# Patient Record
Sex: MALE | Race: WHITE | NOT HISPANIC OR LATINO | Employment: OTHER | URBAN - METROPOLITAN AREA
[De-identification: names, ages, dates, MRNs, and addresses within clinical notes are randomized per-mention and may not be internally consistent; named-entity substitution may affect disease eponyms.]

---

## 2019-01-23 ENCOUNTER — HISTORICAL (OUTPATIENT)
Dept: ADMINISTRATIVE | Facility: HOSPITAL | Age: 69
End: 2019-01-23

## 2022-12-16 ENCOUNTER — OFFICE VISIT (OUTPATIENT)
Dept: URGENT CARE | Facility: CLINIC | Age: 72
End: 2022-12-16
Payer: MEDICARE

## 2022-12-16 VITALS
HEART RATE: 61 BPM | WEIGHT: 210 LBS | OXYGEN SATURATION: 95 % | DIASTOLIC BLOOD PRESSURE: 83 MMHG | TEMPERATURE: 99 F | BODY MASS INDEX: 31.83 KG/M2 | SYSTOLIC BLOOD PRESSURE: 123 MMHG | HEIGHT: 68 IN | RESPIRATION RATE: 20 BRPM

## 2022-12-16 DIAGNOSIS — J06.9 VIRAL URI WITH COUGH: Primary | ICD-10-CM

## 2022-12-16 LAB
CTP QC/QA: YES
MOLECULAR STREP A: NEGATIVE
POC MOLECULAR INFLUENZA A AGN: NEGATIVE
POC MOLECULAR INFLUENZA B AGN: NEGATIVE
SARS-COV-2 RDRP RESP QL NAA+PROBE: NEGATIVE

## 2022-12-16 PROCEDURE — 87502 POCT INFLUENZA A/B MOLECULAR: ICD-10-PCS | Mod: QW,,, | Performed by: FAMILY MEDICINE

## 2022-12-16 PROCEDURE — 99213 OFFICE O/P EST LOW 20 MIN: CPT | Mod: ,,, | Performed by: FAMILY MEDICINE

## 2022-12-16 PROCEDURE — 87651 POCT STREP A MOLECULAR: ICD-10-PCS | Mod: QW,,, | Performed by: FAMILY MEDICINE

## 2022-12-16 PROCEDURE — 99213 PR OFFICE/OUTPT VISIT, EST, LEVL III, 20-29 MIN: ICD-10-PCS | Mod: ,,, | Performed by: FAMILY MEDICINE

## 2022-12-16 PROCEDURE — 87635 SARS-COV-2 COVID-19 AMP PRB: CPT | Mod: QW,CR,, | Performed by: FAMILY MEDICINE

## 2022-12-16 PROCEDURE — 87651 STREP A DNA AMP PROBE: CPT | Mod: QW,,, | Performed by: FAMILY MEDICINE

## 2022-12-16 PROCEDURE — 87502 INFLUENZA DNA AMP PROBE: CPT | Mod: QW,,, | Performed by: FAMILY MEDICINE

## 2022-12-16 PROCEDURE — 87635: ICD-10-PCS | Mod: QW,CR,, | Performed by: FAMILY MEDICINE

## 2022-12-16 RX ORDER — TAMSULOSIN HYDROCHLORIDE 0.4 MG/1
0.4 CAPSULE ORAL EVERY EVENING
COMMUNITY

## 2022-12-16 RX ORDER — EZETIMIBE 10 MG/1
10 TABLET ORAL
COMMUNITY

## 2022-12-16 RX ORDER — MAG HYDROX/ALUMINUM HYD/SIMETH 200-200-20
15 SUSPENSION, ORAL (FINAL DOSE FORM) ORAL
COMMUNITY

## 2022-12-16 RX ORDER — TRIAMTERENE/HYDROCHLOROTHIAZID 37.5-25 MG
1 TABLET ORAL DAILY
COMMUNITY

## 2022-12-16 RX ORDER — METOPROLOL TARTRATE 25 MG/1
12.5 TABLET, FILM COATED ORAL 2 TIMES DAILY
COMMUNITY

## 2022-12-16 RX ORDER — NAPROXEN SODIUM 220 MG/1
81 TABLET, FILM COATED ORAL
Status: ON HOLD | COMMUNITY
End: 2024-03-15 | Stop reason: HOSPADM

## 2022-12-16 RX ORDER — PANTOPRAZOLE SODIUM 40 MG/1
40 TABLET, DELAYED RELEASE ORAL
COMMUNITY

## 2022-12-16 RX ORDER — ROSUVASTATIN CALCIUM 5 MG/1
5 TABLET, COATED ORAL NIGHTLY
Status: ON HOLD | COMMUNITY
End: 2024-03-15 | Stop reason: HOSPADM

## 2022-12-16 RX ORDER — FINASTERIDE 5 MG/1
5 TABLET, FILM COATED ORAL
COMMUNITY

## 2022-12-16 RX ORDER — PROMETHAZINE HYDROCHLORIDE AND DEXTROMETHORPHAN HYDROBROMIDE 6.25; 15 MG/5ML; MG/5ML
5 SYRUP ORAL EVERY 4 HOURS PRN
Qty: 120 ML | Refills: 0 | Status: SHIPPED | OUTPATIENT
Start: 2022-12-16 | End: 2022-12-20

## 2022-12-16 RX ORDER — CLOPIDOGREL BISULFATE 75 MG/1
75 TABLET ORAL
Status: ON HOLD | COMMUNITY
End: 2024-03-15 | Stop reason: HOSPADM

## 2022-12-16 RX ORDER — NITROGLYCERIN 0.4 MG/1
0.4 TABLET SUBLINGUAL
COMMUNITY

## 2022-12-16 RX ORDER — TELMISARTAN 80 MG/1
80 TABLET ORAL
COMMUNITY

## 2022-12-16 NOTE — PROGRESS NOTES
"Subjective:       Patient ID: Carlton Johnson Jr. is a 72 y.o. male.    Vitals:  height is 5' 8" (1.727 m) and weight is 95.3 kg (210 lb). His oral temperature is 99.4 °F (37.4 °C). His blood pressure is 123/83 and his pulse is 61. His respiration is 20 and oxygen saturation is 95%.     Chief Complaint: Sore Throat (Sore throat, cough x 1 week)    HPI  ROS    Objective:      Physical Exam      Assessment:       1. Cough, unspecified type            Plan:         Cough, unspecified type  -     POCT Influenza A/B MOLECULAR  -     POCT Strep A, Molecular  -     POCT COVID-19 Rapid Screening                     "

## 2022-12-16 NOTE — PROGRESS NOTES
Patient ID: [id number]     Chief Complaint: upper respiratory tract infection symptoms    History of Present Illness:     Carlton Johnson Jr. is a 72 y.o. male  who presents today for symptoms of Sore Throat (Sore throat, cough x 1 week)      Pt denies experiencing any fevers, chills, nausea, vomiting, difficulty breathing, dysphagia, or neck stiffness.    Past Medical History:     ----------------------------  Heart attack     Past Surgical History:   Procedure Laterality Date    CORONARY ANGIOPLASTY WITH STENT PLACEMENT         Review of patient's allergies indicates:  No Known Allergies    Outpatient Medications Marked as Taking for the 12/16/22 encounter (Office Visit) with Arpit Sanchez MD   Medication Sig Dispense Refill    aluminum-magnesium hydroxide-simethicone (MAALOX) 200-200-20 mg/5 mL Susp Take 15 mLs by mouth.      aspirin 81 MG Chew Take 81 mg by mouth.      clopidogreL (PLAVIX) 75 mg tablet Take 75 mg by mouth.      ezetimibe (ZETIA) 10 mg tablet Take 10 mg by mouth.      finasteride (PROSCAR) 5 mg tablet Take 5 mg by mouth.      metoprolol tartrate (LOPRESSOR) 25 MG tablet Take 12.5 mg by mouth 2 (two) times daily.      multivit-min-folic-vit K-lycop 400- mcg Tab Take 1 tablet by mouth once daily.      nitroGLYCERIN (NITROSTAT) 0.4 MG SL tablet Place 0.4 mg under the tongue.      pantoprazole (PROTONIX) 40 MG tablet Take 40 mg by mouth 2 (two) times daily before meals.      rosuvastatin (CRESTOR) 5 MG tablet Take 5 mg by mouth every evening.      tamsulosin (FLOMAX) 0.4 mg Cap Take 0.4 mg by mouth once daily.      telmisartan (MICARDIS) 80 MG Tab Take 80 mg by mouth.      triamterene-hydrochlorothiazide 37.5-25 mg (MAXZIDE-25) 37.5-25 mg per tablet Take 1 tablet by mouth once daily.         Social History     Socioeconomic History    Marital status:    Tobacco Use    Smoking status: Never    Smokeless tobacco: Never   Substance and Sexual Activity    Alcohol use: Yes      "Comment: occasionallly    Drug use: Never        Family History   Problem Relation Age of Onset    Alzheimer's disease Mother     Lung cancer Father         Subjective:     Review of Systems   Constitutional:  Negative for chills, fever and malaise/fatigue.   HENT:  Positive for sore throat. Negative for congestion, ear discharge, ear pain and sinus pain.    Respiratory:  Positive for cough. Negative for sputum production, shortness of breath, wheezing and stridor.    Gastrointestinal:  Negative for abdominal pain, diarrhea, nausea and vomiting.   Genitourinary:  Negative for dysuria, frequency and urgency.   Musculoskeletal:  Negative for neck pain.   Skin:  Negative for rash.   Neurological:  Negative for headaches.     Objective:     /83 (BP Location: Left arm)   Pulse 61   Temp 99.4 °F (37.4 °C) (Oral)   Resp 20   Ht 5' 8" (1.727 m)   Wt 95.3 kg (210 lb)   SpO2 95%   BMI 31.93 kg/m²     Physical Exam  Constitutional:       General: He is not in acute distress.     Appearance: Normal appearance. He is normal weight. He is not ill-appearing or toxic-appearing.   HENT:      Head: Normocephalic and atraumatic.      Right Ear: Tympanic membrane and ear canal normal.      Left Ear: Tympanic membrane and ear canal normal.      Nose: No congestion or rhinorrhea.      Mouth/Throat:      Pharynx: Oropharynx is clear. No oropharyngeal exudate or posterior oropharyngeal erythema.   Eyes:      General:         Right eye: No discharge.         Left eye: No discharge.      Extraocular Movements: Extraocular movements intact.      Conjunctiva/sclera: Conjunctivae normal.   Cardiovascular:      Rate and Rhythm: Normal rate and regular rhythm.      Heart sounds: Normal heart sounds. No murmur heard.    No friction rub. No gallop.   Pulmonary:      Effort: Pulmonary effort is normal. No respiratory distress.      Breath sounds: Normal breath sounds. No stridor. No wheezing, rhonchi or rales.   Chest:      Chest wall: " No tenderness.   Musculoskeletal:      Cervical back: No rigidity or tenderness.   Lymphadenopathy:      Cervical: No cervical adenopathy.   Skin:     Coloration: Skin is not pale.   Neurological:      Mental Status: He is alert and oriented to person, place, and time. Mental status is at baseline.   Psychiatric:         Mood and Affect: Mood normal.         Behavior: Behavior normal.       Assessment & Plan:       ICD-10-CM ICD-9-CM   1. Viral URI with cough  J06.9 465.9        1. Viral URI with cough  -     POCT Influenza A/B MOLECULAR  -     POCT Strep A, Molecular  -     POCT COVID-19 Rapid Screening  -     promethazine-dextromethorphan (PROMETHAZINE-DM) 6.25-15 mg/5 mL Syrp; Take 5 mLs by mouth every 4 (four) hours as needed.  Dispense: 120 mL; Refill: 0         Strep negative, Influenza negative, and Covid negative. We talked about symptoms, likely diagnoses and management. We discussed that pt likely has a viral upper respiratory infection that will resolve on its own within 1-2 weeks, and that only symptomatic treatment is indicated at this time. We discussed warning signs and symptoms to monitor for and to seek medical care if they emerge. Pt will return  if symptoms change, worsen, or do not resolved within the expected time range.

## 2024-03-13 ENCOUNTER — HOSPITAL ENCOUNTER (INPATIENT)
Facility: HOSPITAL | Age: 74
LOS: 2 days | Discharge: HOME | DRG: 064 | End: 2024-03-15
Attending: STUDENT IN AN ORGANIZED HEALTH CARE EDUCATION/TRAINING PROGRAM | Admitting: INTERNAL MEDICINE
Payer: MEDICARE

## 2024-03-13 DIAGNOSIS — I63.9 STROKE: ICD-10-CM

## 2024-03-13 DIAGNOSIS — R53.1 WEAKNESS: Primary | ICD-10-CM

## 2024-03-13 DIAGNOSIS — R26.81 UNSTEADY GAIT: ICD-10-CM

## 2024-03-13 DIAGNOSIS — R42 DIZZINESS: ICD-10-CM

## 2024-03-13 DIAGNOSIS — R53.1 GENERALIZED WEAKNESS: ICD-10-CM

## 2024-03-13 LAB
ALBUMIN SERPL-MCNC: 4.1 G/DL (ref 3.4–4.8)
ALBUMIN/GLOB SERPL: 1.4 RATIO (ref 1.1–2)
ALP SERPL-CCNC: 81 UNIT/L (ref 40–150)
ALT SERPL-CCNC: 17 UNIT/L (ref 0–55)
APPEARANCE UR: CLEAR
APTT PPP: 24.2 SECONDS (ref 23.2–33.7)
AST SERPL-CCNC: 16 UNIT/L (ref 5–34)
BACTERIA #/AREA URNS AUTO: ABNORMAL /HPF
BASOPHILS # BLD AUTO: 0.07 X10(3)/MCL
BASOPHILS NFR BLD AUTO: 0.5 %
BILIRUB SERPL-MCNC: 0.7 MG/DL
BILIRUB UR QL STRIP.AUTO: NEGATIVE
BUN SERPL-MCNC: 18.5 MG/DL (ref 8.4–25.7)
CALCIUM SERPL-MCNC: 9.2 MG/DL (ref 8.8–10)
CHLORIDE SERPL-SCNC: 106 MMOL/L (ref 98–107)
CHOLEST SERPL-MCNC: 130 MG/DL
CHOLEST/HDLC SERPL: 2 {RATIO} (ref 0–5)
CO2 SERPL-SCNC: 26 MMOL/L (ref 23–31)
COLOR UR AUTO: ABNORMAL
CREAT SERPL-MCNC: 1.13 MG/DL (ref 0.73–1.18)
CRP SERPL-MCNC: 1.4 MG/L
EOSINOPHIL # BLD AUTO: 0.03 X10(3)/MCL (ref 0–0.9)
EOSINOPHIL NFR BLD AUTO: 0.2 %
ERYTHROCYTE [DISTWIDTH] IN BLOOD BY AUTOMATED COUNT: 13.5 % (ref 11.5–17)
ERYTHROCYTE [SEDIMENTATION RATE] IN BLOOD: 4 MM/HR (ref 0–15)
EST. AVERAGE GLUCOSE BLD GHB EST-MCNC: 119.8 MG/DL
GFR SERPLBLD CREATININE-BSD FMLA CKD-EPI: >60 MLS/MIN/1.73/M2
GLOBULIN SER-MCNC: 3 GM/DL (ref 2.4–3.5)
GLUCOSE SERPL-MCNC: 113 MG/DL (ref 82–115)
GLUCOSE UR QL STRIP.AUTO: NORMAL
HBA1C MFR BLD: 5.8 %
HCT VFR BLD AUTO: 45.3 % (ref 42–52)
HDLC SERPL-MCNC: 53 MG/DL (ref 35–60)
HGB BLD-MCNC: 15.4 G/DL (ref 14–18)
IMM GRANULOCYTES # BLD AUTO: 0.08 X10(3)/MCL (ref 0–0.04)
IMM GRANULOCYTES NFR BLD AUTO: 0.6 %
INR PPP: 1
KETONES UR QL STRIP.AUTO: NEGATIVE
LDLC SERPL CALC-MCNC: 63 MG/DL (ref 50–140)
LEUKOCYTE ESTERASE UR QL STRIP.AUTO: NEGATIVE
LYMPHOCYTES # BLD AUTO: 1.78 X10(3)/MCL (ref 0.6–4.6)
LYMPHOCYTES NFR BLD AUTO: 13.5 %
MAGNESIUM SERPL-MCNC: 1.9 MG/DL (ref 1.6–2.6)
MCH RBC QN AUTO: 29.6 PG (ref 27–31)
MCHC RBC AUTO-ENTMCNC: 34 G/DL (ref 33–36)
MCV RBC AUTO: 86.9 FL (ref 80–94)
MONOCYTES # BLD AUTO: 1.01 X10(3)/MCL (ref 0.1–1.3)
MONOCYTES NFR BLD AUTO: 7.6 %
MUCOUS THREADS URNS QL MICRO: ABNORMAL /LPF
NEUTROPHILS # BLD AUTO: 10.24 X10(3)/MCL (ref 2.1–9.2)
NEUTROPHILS NFR BLD AUTO: 77.6 %
NITRITE UR QL STRIP.AUTO: NEGATIVE
NRBC BLD AUTO-RTO: 0 %
OHS QRS DURATION: 80 MS
OHS QTC CALCULATION: 424 MS
PH UR STRIP.AUTO: 5.5 [PH]
PLATELET # BLD AUTO: 246 X10(3)/MCL (ref 130–400)
PMV BLD AUTO: 10.1 FL (ref 7.4–10.4)
POTASSIUM SERPL-SCNC: 3.9 MMOL/L (ref 3.5–5.1)
PROT SERPL-MCNC: 7.1 GM/DL (ref 5.8–7.6)
PROT UR QL STRIP.AUTO: NEGATIVE
PROTHROMBIN TIME: 13 SECONDS (ref 12.5–14.5)
RBC # BLD AUTO: 5.21 X10(6)/MCL (ref 4.7–6.1)
RBC #/AREA URNS AUTO: ABNORMAL /HPF
RBC UR QL AUTO: NEGATIVE
SODIUM SERPL-SCNC: 139 MMOL/L (ref 136–145)
SP GR UR STRIP.AUTO: 1.02 (ref 1–1.03)
SQUAMOUS #/AREA URNS LPF: ABNORMAL /HPF
TRIGL SERPL-MCNC: 70 MG/DL (ref 34–140)
TROPONIN I SERPL-MCNC: <0.01 NG/ML (ref 0–0.04)
TSH SERPL-ACNC: 0.77 UIU/ML (ref 0.35–4.94)
UROBILINOGEN UR STRIP-ACNC: NORMAL
VLDLC SERPL CALC-MCNC: 14 MG/DL
WBC # SPEC AUTO: 13.21 X10(3)/MCL (ref 4.5–11.5)
WBC #/AREA URNS AUTO: ABNORMAL /HPF

## 2024-03-13 PROCEDURE — 25000003 PHARM REV CODE 250: Performed by: PHYSICIAN ASSISTANT

## 2024-03-13 PROCEDURE — 25500020 PHARM REV CODE 255: Performed by: PHYSICIAN ASSISTANT

## 2024-03-13 PROCEDURE — 85652 RBC SED RATE AUTOMATED: CPT | Performed by: PHYSICIAN ASSISTANT

## 2024-03-13 PROCEDURE — 86140 C-REACTIVE PROTEIN: CPT | Performed by: PHYSICIAN ASSISTANT

## 2024-03-13 PROCEDURE — 99223 1ST HOSP IP/OBS HIGH 75: CPT | Mod: FS,,, | Performed by: PSYCHIATRY & NEUROLOGY

## 2024-03-13 PROCEDURE — A4216 STERILE WATER/SALINE, 10 ML: HCPCS | Performed by: PHYSICIAN ASSISTANT

## 2024-03-13 PROCEDURE — 83735 ASSAY OF MAGNESIUM: CPT | Performed by: STUDENT IN AN ORGANIZED HEALTH CARE EDUCATION/TRAINING PROGRAM

## 2024-03-13 PROCEDURE — 25500020 PHARM REV CODE 255: Performed by: INTERNAL MEDICINE

## 2024-03-13 PROCEDURE — 93010 ELECTROCARDIOGRAM REPORT: CPT | Mod: ,,, | Performed by: INTERNAL MEDICINE

## 2024-03-13 PROCEDURE — 21400001 HC TELEMETRY ROOM

## 2024-03-13 PROCEDURE — 83036 HEMOGLOBIN GLYCOSYLATED A1C: CPT | Performed by: PHYSICIAN ASSISTANT

## 2024-03-13 PROCEDURE — 11000001 HC ACUTE MED/SURG PRIVATE ROOM

## 2024-03-13 PROCEDURE — 99285 EMERGENCY DEPT VISIT HI MDM: CPT | Mod: 25

## 2024-03-13 PROCEDURE — 85610 PROTHROMBIN TIME: CPT | Performed by: STUDENT IN AN ORGANIZED HEALTH CARE EDUCATION/TRAINING PROGRAM

## 2024-03-13 PROCEDURE — 81001 URINALYSIS AUTO W/SCOPE: CPT | Performed by: STUDENT IN AN ORGANIZED HEALTH CARE EDUCATION/TRAINING PROGRAM

## 2024-03-13 PROCEDURE — 85730 THROMBOPLASTIN TIME PARTIAL: CPT | Performed by: STUDENT IN AN ORGANIZED HEALTH CARE EDUCATION/TRAINING PROGRAM

## 2024-03-13 PROCEDURE — 25000003 PHARM REV CODE 250: Performed by: INTERNAL MEDICINE

## 2024-03-13 PROCEDURE — 80061 LIPID PANEL: CPT | Performed by: PHYSICIAN ASSISTANT

## 2024-03-13 PROCEDURE — 93005 ELECTROCARDIOGRAM TRACING: CPT

## 2024-03-13 PROCEDURE — 80053 COMPREHEN METABOLIC PANEL: CPT | Performed by: STUDENT IN AN ORGANIZED HEALTH CARE EDUCATION/TRAINING PROGRAM

## 2024-03-13 PROCEDURE — 84443 ASSAY THYROID STIM HORMONE: CPT | Performed by: PHYSICIAN ASSISTANT

## 2024-03-13 PROCEDURE — 85025 COMPLETE CBC W/AUTO DIFF WBC: CPT | Performed by: STUDENT IN AN ORGANIZED HEALTH CARE EDUCATION/TRAINING PROGRAM

## 2024-03-13 PROCEDURE — 84484 ASSAY OF TROPONIN QUANT: CPT | Performed by: STUDENT IN AN ORGANIZED HEALTH CARE EDUCATION/TRAINING PROGRAM

## 2024-03-13 RX ORDER — RANOLAZINE 500 MG/1
500 TABLET, EXTENDED RELEASE ORAL 2 TIMES DAILY
COMMUNITY

## 2024-03-13 RX ORDER — ATORVASTATIN CALCIUM 20 MG/1
20 TABLET, FILM COATED ORAL DAILY
Status: ON HOLD | COMMUNITY
End: 2024-03-15 | Stop reason: HOSPADM

## 2024-03-13 RX ORDER — CHOLECALCIFEROL (VITAMIN D3) 25 MCG
1000 TABLET ORAL DAILY
COMMUNITY

## 2024-03-13 RX ORDER — BISACODYL 10 MG/1
10 SUPPOSITORY RECTAL DAILY PRN
Status: DISCONTINUED | OUTPATIENT
Start: 2024-03-13 | End: 2024-03-15 | Stop reason: HOSPADM

## 2024-03-13 RX ORDER — EZETIMIBE 10 MG/1
10 TABLET ORAL DAILY
Status: DISCONTINUED | OUTPATIENT
Start: 2024-03-14 | End: 2024-03-15 | Stop reason: HOSPADM

## 2024-03-13 RX ORDER — PANTOPRAZOLE SODIUM 40 MG/1
40 TABLET, DELAYED RELEASE ORAL
Status: DISCONTINUED | OUTPATIENT
Start: 2024-03-13 | End: 2024-03-15 | Stop reason: HOSPADM

## 2024-03-13 RX ORDER — LABETALOL HYDROCHLORIDE 5 MG/ML
10 INJECTION, SOLUTION INTRAVENOUS
Status: DISCONTINUED | OUTPATIENT
Start: 2024-03-13 | End: 2024-03-15 | Stop reason: HOSPADM

## 2024-03-13 RX ORDER — ATORVASTATIN CALCIUM 40 MG/1
80 TABLET, FILM COATED ORAL DAILY
Status: DISCONTINUED | OUTPATIENT
Start: 2024-03-13 | End: 2024-03-15 | Stop reason: HOSPADM

## 2024-03-13 RX ORDER — FINASTERIDE 5 MG/1
5 TABLET, FILM COATED ORAL DAILY
Status: DISCONTINUED | OUTPATIENT
Start: 2024-03-14 | End: 2024-03-15 | Stop reason: HOSPADM

## 2024-03-13 RX ORDER — DIAZEPAM 5 MG/1
5 TABLET ORAL ONCE
Status: COMPLETED | OUTPATIENT
Start: 2024-03-13 | End: 2024-03-13

## 2024-03-13 RX ORDER — NAPROXEN 500 MG/1
500 TABLET ORAL 2 TIMES DAILY WITH MEALS
COMMUNITY

## 2024-03-13 RX ORDER — SODIUM CHLORIDE 0.9 % (FLUSH) 0.9 %
10 SYRINGE (ML) INJECTION EVERY 8 HOURS
Status: DISCONTINUED | OUTPATIENT
Start: 2024-03-13 | End: 2024-03-15 | Stop reason: HOSPADM

## 2024-03-13 RX ORDER — ASPIRIN 325 MG
325 TABLET, DELAYED RELEASE (ENTERIC COATED) ORAL
Status: COMPLETED | OUTPATIENT
Start: 2024-03-13 | End: 2024-03-13

## 2024-03-13 RX ORDER — RANOLAZINE 500 MG/1
500 TABLET, EXTENDED RELEASE ORAL 2 TIMES DAILY
Status: DISCONTINUED | OUTPATIENT
Start: 2024-03-13 | End: 2024-03-15 | Stop reason: HOSPADM

## 2024-03-13 RX ORDER — LORAZEPAM 1 MG/1
1 TABLET ORAL ONCE
Status: DISCONTINUED | OUTPATIENT
Start: 2024-03-13 | End: 2024-03-13

## 2024-03-13 RX ORDER — TAMSULOSIN HYDROCHLORIDE 0.4 MG/1
0.4 CAPSULE ORAL EVERY EVENING
Status: DISCONTINUED | OUTPATIENT
Start: 2024-03-13 | End: 2024-03-15 | Stop reason: HOSPADM

## 2024-03-13 RX ADMIN — IOHEXOL 50 ML: 350 INJECTION, SOLUTION INTRAVENOUS at 02:03

## 2024-03-13 RX ADMIN — RANOLAZINE 500 MG: 500 TABLET, EXTENDED RELEASE ORAL at 09:03

## 2024-03-13 RX ADMIN — SODIUM CHLORIDE, PRESERVATIVE FREE 10 ML: 5 INJECTION INTRAVENOUS at 04:03

## 2024-03-13 RX ADMIN — ATORVASTATIN CALCIUM 80 MG: 40 TABLET, FILM COATED ORAL at 06:03

## 2024-03-13 RX ADMIN — ASPIRIN 325 MG: 325 TABLET, COATED ORAL at 04:03

## 2024-03-13 RX ADMIN — PERFLUTREN 1.3 ML: 6.52 INJECTION, SUSPENSION INTRAVENOUS at 05:03

## 2024-03-13 RX ADMIN — DIAZEPAM 5 MG: 5 TABLET ORAL at 03:03

## 2024-03-13 RX ADMIN — PANTOPRAZOLE SODIUM 40 MG: 40 TABLET, DELAYED RELEASE ORAL at 06:03

## 2024-03-13 RX ADMIN — TAMSULOSIN HYDROCHLORIDE 0.4 MG: 0.4 CAPSULE ORAL at 08:03

## 2024-03-13 RX ADMIN — SODIUM CHLORIDE, PRESERVATIVE FREE 10 ML: 5 INJECTION INTRAVENOUS at 10:03

## 2024-03-13 NOTE — SUBJECTIVE & OBJECTIVE
Past Medical History:   Diagnosis Date    Heart attack        Past Surgical History:   Procedure Laterality Date    CORONARY ANGIOPLASTY WITH STENT PLACEMENT         Review of patient's allergies indicates:  No Known Allergies    Current Neurological Medications:     No current facility-administered medications on file prior to encounter.     Current Outpatient Medications on File Prior to Encounter   Medication Sig    aspirin 81 MG Chew Take 81 mg by mouth.    atorvastatin (LIPITOR) 20 MG tablet Take 20 mg by mouth once daily.    ezetimibe (ZETIA) 10 mg tablet Take 10 mg by mouth.    multivit-min-folic-vit K-lycop 400- mcg Tab Take 1 tablet by mouth once daily.    naproxen (EC NAPROSYN) 500 MG EC tablet Take 500 mg by mouth 2 (two) times daily with meals.    pantoprazole (PROTONIX) 40 MG tablet Take 40 mg by mouth 2 (two) times daily before meals.    ranolazine (RANEXA) 500 MG Tb12 Take 500 mg by mouth 2 (two) times daily.    tamsulosin (FLOMAX) 0.4 mg Cap Take 0.4 mg by mouth once daily.    telmisartan (MICARDIS) 80 MG Tab Take 80 mg by mouth.    triamterene-hydrochlorothiazide 37.5-25 mg (MAXZIDE-25) 37.5-25 mg per tablet Take 1 tablet by mouth once daily.    vitamin D (VITAMIN D3) 1000 units Tab Take 1,000 Units by mouth once daily.    aluminum-magnesium hydroxide-simethicone (MAALOX) 200-200-20 mg/5 mL Susp Take 15 mLs by mouth.    clopidogreL (PLAVIX) 75 mg tablet Take 75 mg by mouth.    finasteride (PROSCAR) 5 mg tablet Take 5 mg by mouth.    metoprolol tartrate (LOPRESSOR) 25 MG tablet Take 12.5 mg by mouth 2 (two) times daily.    nitroGLYCERIN (NITROSTAT) 0.4 MG SL tablet Place 0.4 mg under the tongue.    rosuvastatin (CRESTOR) 5 MG tablet Take 5 mg by mouth every evening.     Family History       Problem Relation (Age of Onset)    Alzheimer's disease Mother    Lung cancer Father          Tobacco Use    Smoking status: Never    Smokeless tobacco: Never   Substance and Sexual Activity    Alcohol use:  Yes     Comment: occasionallly    Drug use: Never    Sexual activity: Not on file     Review of Systems   Constitutional:  Positive for fatigue.   Neurological:  Positive for dizziness and weakness (generalized). Negative for speech difficulty.   All other systems reviewed and are negative.    Objective:     Vital Signs (Most Recent):  Temp: 97.7 °F (36.5 °C) (03/13/24 1017)  Pulse: 81 (03/13/24 1457)  Resp: 18 (03/13/24 1017)  BP: (!) 141/96 (03/13/24 1457)  SpO2: 96 % (03/13/24 1457) Vital Signs (24h Range):  Temp:  [97.7 °F (36.5 °C)] 97.7 °F (36.5 °C)  Pulse:  [80-92] 81  Resp:  [18] 18  SpO2:  [96 %-100 %] 96 %  BP: (120-141)/(71-96) 141/96     Weight: 93 kg (205 lb)  Body mass index is 30.27 kg/m².     Physical Exam  Vitals reviewed.   Constitutional:       Appearance: Normal appearance.   HENT:      Head: Normocephalic and atraumatic.      Mouth/Throat:      Mouth: Mucous membranes are moist.   Eyes:      Extraocular Movements: Extraocular movements intact.      Pupils: Pupils are equal, round, and reactive to light.   Pulmonary:      Effort: Pulmonary effort is normal.   Abdominal:      Palpations: Abdomen is soft.   Musculoskeletal:         General: Normal range of motion.      Cervical back: Normal range of motion and neck supple.   Skin:     General: Skin is warm and dry.      Capillary Refill: Capillary refill takes less than 2 seconds.   Neurological:      General: No focal deficit present.      Mental Status: He is alert and oriented to person, place, and time.      Cranial Nerves: No cranial nerve deficit.      Sensory: No sensory deficit.      Motor: No weakness.      Coordination: Coordination normal.   Psychiatric:         Mood and Affect: Mood normal.         Behavior: Behavior normal.         Thought Content: Thought content normal.         Judgment: Judgment normal.          NEUROLOGICAL EXAMINATION:     MENTAL STATUS   Oriented to person, place, and time.     CRANIAL NERVES     CN III, IV, VI    Pupils are equal, round, and reactive to light.      Significant Labs: BMP:   Recent Labs   Lab 03/13/24  1048      K 3.9   CO2 26   BUN 18.5   CREATININE 1.13   CALCIUM 9.2   MG 1.90     CBC:   Recent Labs   Lab 03/13/24  1048   WBC 13.21*   HGB 15.4   HCT 45.3          Significant Imaging: I have reviewed all pertinent imaging results/findings within the past 24 hours.

## 2024-03-13 NOTE — ED PROVIDER NOTES
"Encounter Date: 3/13/2024    SCRIBE #1 NOTE: I, Yamileth Pisano, am scribing for, and in the presence of,  Robert Sethi IV, MD. I have scribed the following portions of the note - the EKG reading. Other sections scribed: HPI, ROS, PE, MDM.       History     Chief Complaint   Patient presents with    Weakness     Weakness & dizziness that started this morning. Multiple falls since last night. Hit head. -LOC & -BT.      73 Y.O. male with a history of MI, cardiac stents, and vertigo presents to the ED for generalized weakness onset last night. Pt reports falling out of his bed last night and this morning, where he was unable to pick himself up. Reports hitting head. Complains of dizziness with movement and the inability to walk. States that he "feels drunk" when attempting to walk. Denies blood thinners or unilateral weakness. Further denies hip pain, rash, fever, and N/V. States that his symptoms are different from episodes of vertigo in the past. Denies hx of CVA. Lastly reports getting a tick bite to his LLE 2 weeks ago.    The history is provided by the patient and the spouse.     Review of patient's allergies indicates:  No Known Allergies  Past Medical History:   Diagnosis Date    Heart attack      Past Surgical History:   Procedure Laterality Date    CORONARY ANGIOPLASTY WITH STENT PLACEMENT       Family History   Problem Relation Age of Onset    Alzheimer's disease Mother     Lung cancer Father      Social History     Tobacco Use    Smoking status: Never    Smokeless tobacco: Never   Substance Use Topics    Alcohol use: Yes     Comment: occasionallly    Drug use: Never     Review of Systems   Constitutional:  Positive for fatigue. Negative for chills and fever.   HENT:  Negative for congestion, rhinorrhea and sore throat.    Eyes:  Negative for visual disturbance.   Respiratory:  Negative for cough and shortness of breath.    Cardiovascular:  Negative for chest pain.   Gastrointestinal:  Negative for abdominal " pain, nausea and vomiting.   Genitourinary:  Negative for dysuria and hematuria.   Musculoskeletal:  Negative for arthralgias and joint swelling.   Skin:  Negative for rash.   Neurological:  Positive for dizziness and weakness.   Psychiatric/Behavioral:  Negative for confusion.    All other systems reviewed and are negative.      Physical Exam     Initial Vitals [03/13/24 1017]   BP Pulse Resp Temp SpO2   (!) 132/92 82 18 97.7 °F (36.5 °C) 98 %      MAP       --         Physical Exam    Nursing note and vitals reviewed.  Constitutional: He is not diaphoretic. No distress.   HENT:   Head: Normocephalic.   Neck: Neck supple.   Normal range of motion.  Cardiovascular:  Normal rate and regular rhythm.           Pulmonary/Chest: Breath sounds normal. No respiratory distress.   Abdominal: Abdomen is soft. He exhibits no distension. There is no abdominal tenderness.   Musculoskeletal:         General: No edema.      Cervical back: Normal range of motion and neck supple.     Neurological: He is alert and oriented to person, place, and time. No cranial nerve deficit or sensory deficit.   Completely normal neuro exam.    Skin: Skin is warm. Capillary refill takes less than 2 seconds.   Psychiatric: He has a normal mood and affect.         ED Course   Procedures  Labs Reviewed   URINALYSIS, REFLEX TO URINE CULTURE - Abnormal; Notable for the following components:       Result Value    Mucous, UA Trace (*)     All other components within normal limits   CBC WITH DIFFERENTIAL - Abnormal; Notable for the following components:    WBC 13.21 (*)     Neut # 10.24 (*)     IG# 0.08 (*)     All other components within normal limits   MAGNESIUM - Normal   PROTIME-INR - Normal   APTT - Normal   TROPONIN I - Normal   CBC W/ AUTO DIFFERENTIAL    Narrative:     The following orders were created for panel order CBC auto differential.  Procedure                               Abnormality         Status                     ---------                                -----------         ------                     CBC with Differential[[id number]]       Abnormal            Final result                 Please view results for these tests on the individual orders.   COMPREHENSIVE METABOLIC PANEL     EKG Readings: (Independently Interpreted)   Initial Reading: No STEMI. Rhythm: Normal Sinus Rhythm. Heart Rate: 79. Ectopy: No Ectopy. Conduction: Normal. ST Segments: Normal ST Segments. T Waves: Normal. Axis: Normal. Clinical Impression: Normal Sinus Rhythm   EKG performed at 1019 on 3/13/2024. Low voltage QRS complex.      ECG Results              EKG 12-lead (Final result)        Collection Time Result Time QRS Duration OHS QTC Calculation    03/13/24 10:17:58 03/13/24 12:48:02 80 424                     Final result by Interface, Lab In [facility] [place] (03/13/24 12:48:09)                   Narrative:    Test Reason : R53.1,    Vent. Rate : 078 BPM     Atrial Rate : 078 BPM     P-R Int : 140 ms          QRS Dur : 080 ms      QT Int : 372 ms       P-R-T Axes : 031 -01 005 degrees     QTc Int : 424 ms    Normal sinus rhythm  Low voltage QRS  Septal infarct ,age undetermined  Abnormal ECG  No previous ECGs available  Confirmed by Augustin Arthur MD ([id number]) on 3/13/2024 12:47:59 PM    Referred By: [email or web address]   SELF           Confirmed By:Augustin Arthur MD                                  Imaging Results              X-Ray Chest AP Portable (Final result)  Result time 03/13/24 11:49:12      Final result by Albert Petersen MD (03/13/24 11:49:12)                   Impression:      NO ACUTE CARDIOPULMONARY PROCESS IDENTIFIED.      Electronically signed by: Albert Petersen  Date:    03/13/2024  Time:    11:49               Narrative:    EXAMINATION:  XR CHEST AP PORTABLE    CLINICAL HISTORY:  Weakness    TECHNIQUE:  One view    COMPARISON:  None available.    FINDINGS:  Cardiopericardial silhouette is within normal limits.  No acute dense focal or segmental consolidation, congestive  process, pleural effusions or pneumothorax.  Scoliosis.                                       CT Cervical Spine Without Contrast (Final result)  Result time 03/13/24 11:28:04      Final result by Rosenda Eden MD (03/13/24 11:28:04)                   Impression:      No acute fracture identified.      Electronically signed by: Rosenda Eden  Date:    03/13/2024  Time:    11:28               Narrative:    EXAMINATION:  CT CERVICAL SPINE WITHOUT CONTRAST    CLINICAL HISTORY:  Neck trauma (Age >= 65y);    TECHNIQUE:  Noncontrast CT images of the cervical spine. Axial, coronal, and sagittal reformatted images were obtained. Dose length product is 1361 mGycm. Automatic exposure control, adjustment of mA/kV or iterative reconstruction technique was used to limit radiation dose.    COMPARISON:  None    FINDINGS:  The cervical spine is visualized through the level of T1.    There is no acute fracture identified.  There are multilevel degenerative changes with disc height loss, marginal osteophyte formation and facet arthropathy.  There is no paraspinal hematoma.                                       CT Head Without Contrast (Final result)  Result time 03/13/24 11:29:37      Final result by Rosenda Eden MD (03/13/24 11:29:37)                   Impression:      1. No acute intracranial abnormality.  2. Chronic microvascular ischemic changes.      Electronically signed by: Rosenda Eden  Date:    03/13/2024  Time:    11:29               Narrative:    EXAMINATION:  CT HEAD WITHOUT CONTRAST    CLINICAL HISTORY:  Head trauma, moderate-severe;    TECHNIQUE:  Axial scans were obtained from skull base to the vertex.    Coronal and sagittal reconstructions obtained from the axial data.    Automatic exposure control was utilized to limit radiation dose.    Contrast: None    Radiation Dose:    Total DLP: 1361 mGy*cm    COMPARISON:  None    FINDINGS:  There is no acute intracranial hemorrhage or edema. The  "gray-white matter differentiation is preserved.  Scattered hypodensities in the subcortical and periventricular white matter likely represent chronic microvascular ischemic changes.    There is no mass effect or midline shift.  There is diffuse parenchymal volume loss.  The basal cisterns are patent. There is no abnormal extra-axial fluid collection.  Scattered carotid artery calcifications are noted.    The calvarium and skull base are intact.  There is trace scattered paranasal sinus mucosal thickening.                                       Medications - No data to display  Medical Decision Making  73-year-old male no significant past medical history presenting with multiple falls after onset of dizziness around 130 this morning.  States he "feels drunk" despite not drinking any alcohol.  Reports he has no dizziness at rest however.  History of peripheral vertigo.  Exam with a completely normal neurologically no dysmetria normal heel-shin tests have not yet formally tested gait will obtain labs CT head if his workup here is negative will test gait if his gait is still unsteady will likely require admission for posterior circulation stroke rule out given his age    The differential diagnosis includes, but is not limited to:  CVA ICH peripheral vertigo viral infection ACS electrolyte derangement dehydration    Problems Addressed:  Dizziness: acute illness or injury that poses a threat to life or bodily functions  Generalized weakness: acute illness or injury that poses a threat to life or bodily functions  Unsteady gait: acute illness or injury that poses a threat to life or bodily functions    Amount and/or Complexity of Data Reviewed  Labs: ordered.  Radiology: ordered and independent interpretation performed.     Details: Head CT - no obvious bleed or mass     ECG/medicine tests: ordered and independent interpretation performed.  Discussion of management or test interpretation with external provider(s): Discussed " "with hospitalist - will admit    Risk  Decision regarding hospitalization.            Scribe Attestation:   Scribe #1: I performed the above scribed service and the documentation accurately describes the services I performed. I attest to the accuracy of the note.    Attending Attestation:           Physician Attestation for Scribe:  Physician Attestation Statement for Scribe #1: I, Robert Sethi IV, MD, reviewed documentation, as scribed by Yamileth Pisano in my presence, and it is both accurate and complete.             ED Course as of 03/13/24 1249   Wed Mar 13, 2024   1152 73-year-old male no significant past medical history presenting with multiple falls after onset of dizziness around 130 this morning.  States he "feels drunk" despite not drinking any alcohol.  Reports he has no dizziness at rest however.  History of peripheral vertigo.  Exam with a completely normal neurologically no dysmetria normal heel-shin tests have not yet formally tested gait will obtain labs CT head if his workup here is negative will test gait if his gait is still unsteady will likely require admission for posterior circulation stroke rule out given his age   [AC]   1159 Initial workup unremarkable - patient unable to ambulate will need admission for posterior stroke rule out at this time [AC]   1200 Paged Marya with hospital medicine  [MM]      ED Course User Index  [AC] Robert Sethi IV, MD  [MM] Chasity Cardenas                           Clinical Impression:  Final diagnoses:  [R53.1] Generalized weakness  [R42] Dizziness  [R26.81] Unsteady gait          ED Disposition Condition    Admit                 Robert Sethi IV, MD  03/13/24 1254    "

## 2024-03-13 NOTE — ASSESSMENT & PLAN NOTE
Rule out stroke  -presented with dizziness  RF: CAD  Etiology: TBD      CT head negative      Plan:    Admit for stroke workup  Allow permissive HTN ... prn hydralazine and labetalol for SBP > 220 or DBP > 120     - after 24 hours from symptom onset, ok to normalize blood pressure  Neuro checks q4hr ... stat CTh if any neuro change   Begin ASA 325mg daily .... if failed Lorenzo, then ASA 300mg AL daily  DVT ppx with SCD or lovenox 40 s/c daily  Continuous telemetry monitoring  Bedrest and HOB flat for 24 hours  NPO until passes Lorenzo or cleared by SLP  PT/OT/SLP to evaluate after 24 hour bedrest completed (from symptom onset)    Further recommendations may follow per MD

## 2024-03-13 NOTE — HPI
Carlton Johnson is a 73 year-old male with pat medical history of MI s/p cardiac stents and vertigo who presented to the ED with complaints of generalized weakness and dizziness. Patient reported falling out of bed and and striking his head. He endorsed dizziness with any movement and an inability to walk. Per chart, he states that this dizziness is different from episodes of vertigo in the past.  CT head unremarkable. NSR on EKG. Labs unremarkable. Patient was afebrile and hemodynamically stable.

## 2024-03-13 NOTE — CONSULTS
[facility] - Emergency Dept  Neurology  Consult Note    Patient Name: Carlton Johnson Jr.  MRN: [id number]  Admission Date: 3/13/2024  Hospital Length of Stay: 0 days  Code Status: Full Code   Attending Provider: Arielle Burrell MD   Consulting Provider: Darlin Alston NP  Primary Care Physician: No, Primary Doctor  Principal Problem:<principal problem not specified>    Inpatient consult to Neurology  Consult performed by: Darlin Alston NP  Consult ordered by: Millie Hamm PA-C         Subjective:     Chief Complaint:    Chief Complaint   Patient presents with    Weakness     Weakness & dizziness that started this morning. Multiple falls since last night. Hit head. -LOC & -BT.           HPI:   Carlton Johnson is a 73 year-old male with pat medical history of MI s/p cardiac stents and vertigo who presented to the ED with complaints of generalized weakness and dizziness. Patient reported falling out of bed and and striking his head. He endorsed dizziness with any movement and an inability to walk. Per chart, he states that this dizziness is different from episodes of vertigo in the past.  CT head unremarkable. NSR on EKG. Labs unremarkable. Patient was afebrile and hemodynamically stable.      Past Medical History:   Diagnosis Date    Heart attack        Past Surgical History:   Procedure Laterality Date    CORONARY ANGIOPLASTY WITH STENT PLACEMENT         Review of patient's allergies indicates:  No Known Allergies    Current Neurological Medications:     No current facility-administered medications on file prior to encounter.     Current Outpatient Medications on File Prior to Encounter   Medication Sig    aspirin 81 MG Chew Take 81 mg by mouth.    atorvastatin (LIPITOR) 20 MG tablet Take 20 mg by mouth once daily.    ezetimibe (ZETIA) 10 mg tablet Take 10 mg by mouth.    multivit-min-folic-vit K-lycop 400- mcg Tab Take 1 tablet by mouth once daily.    naproxen (EC  NAPROSYN) 500 MG EC tablet Take 500 mg by mouth 2 (two) times daily with meals.    pantoprazole (PROTONIX) 40 MG tablet Take 40 mg by mouth 2 (two) times daily before meals.    ranolazine (RANEXA) 500 MG Tb12 Take 500 mg by mouth 2 (two) times daily.    tamsulosin (FLOMAX) 0.4 mg Cap Take 0.4 mg by mouth once daily.    telmisartan (MICARDIS) 80 MG Tab Take 80 mg by mouth.    triamterene-hydrochlorothiazide 37.5-25 mg (MAXZIDE-25) 37.5-25 mg per tablet Take 1 tablet by mouth once daily.    vitamin D (VITAMIN D3) 1000 units Tab Take 1,000 Units by mouth once daily.    aluminum-magnesium hydroxide-simethicone (MAALOX) 200-200-20 mg/5 mL Susp Take 15 mLs by mouth.    clopidogreL (PLAVIX) 75 mg tablet Take 75 mg by mouth.    finasteride (PROSCAR) 5 mg tablet Take 5 mg by mouth.    metoprolol tartrate (LOPRESSOR) 25 MG tablet Take 12.5 mg by mouth 2 (two) times daily.    nitroGLYCERIN (NITROSTAT) 0.4 MG SL tablet Place 0.4 mg under the tongue.    rosuvastatin (CRESTOR) 5 MG tablet Take 5 mg by mouth every evening.     Family History       Problem Relation (Age of Onset)    Alzheimer's disease Mother    Lung cancer Father          Tobacco Use    Smoking status: Never    Smokeless tobacco: Never   Substance and Sexual Activity    Alcohol use: Yes     Comment: occasionallly    Drug use: Never    Sexual activity: Not on file     Review of Systems   Constitutional:  Positive for fatigue.   Neurological:  Positive for dizziness and weakness (generalized). Negative for speech difficulty.   All other systems reviewed and are negative.    Objective:     Vital Signs (Most Recent):  Temp: 97.7 °F (36.5 °C) (03/13/24 1017)  Pulse: 81 (03/13/24 1457)  Resp: 18 (03/13/24 1017)  BP: (!) 141/96 (03/13/24 1457)  SpO2: 96 % (03/13/24 1457) Vital Signs (24h Range):  Temp:  [97.7 °F (36.5 °C)] 97.7 °F (36.5 °C)  Pulse:  [80-92] 81  Resp:  [18] 18  SpO2:  [96 %-100 %] 96 %  BP: (120-141)/(71-96) 141/96     Weight: 93 kg (205 lb)  Body mass  index is 30.27 kg/m².     Physical Exam  Vitals reviewed.   Constitutional:       Appearance: Normal appearance.   HENT:      Head: Normocephalic and atraumatic.      Mouth/Throat:      Mouth: Mucous membranes are moist.   Eyes:      Extraocular Movements: Extraocular movements intact.      Pupils: Pupils are equal, round, and reactive to light.   Pulmonary:      Effort: Pulmonary effort is normal.   Abdominal:      Palpations: Abdomen is soft.   Musculoskeletal:         General: Normal range of motion.      Cervical back: Normal range of motion and neck supple.   Skin:     General: Skin is warm and dry.      Capillary Refill: Capillary refill takes less than 2 seconds.   Neurological:      General: No focal deficit present.      Mental Status: He is alert and oriented to person, place, and time.      Cranial Nerves: No cranial nerve deficit.      Sensory: No sensory deficit.      Motor: No weakness.      Coordination: Coordination normal.   Psychiatric:         Mood and Affect: Mood normal.         Behavior: Behavior normal.         Thought Content: Thought content normal.         Judgment: Judgment normal.          NEUROLOGICAL EXAMINATION:     MENTAL STATUS   Oriented to person, place, and time.     CRANIAL NERVES     CN III, IV, VI   Pupils are equal, round, and reactive to light.      Significant Labs: BMP:   Recent Labs   Lab 03/13/24  1048      K 3.9   CO2 26   BUN 18.5   CREATININE 1.13   CALCIUM 9.2   MG 1.90     CBC:   Recent Labs   Lab 03/13/24  1048   WBC 13.21*   HGB 15.4   HCT 45.3          Significant Imaging: I have reviewed all pertinent imaging results/findings within the past 24 hours.  Assessment and Plan:     Dizziness   Rule out stroke  -presented with dizziness  RF: CAD  Etiology: TBD      CT head negative      Plan:    Admit for stroke workup  Allow permissive HTN ... prn hydralazine and labetalol for SBP > 220 or DBP > 120     - after 24 hours from symptom onset, ok to normalize  blood pressure  Neuro checks q4hr ... stat CTh if any neuro change   Begin ASA 325mg daily .... if failed Lornezo, then ASA 300mg AK daily  DVT ppx with SCD or lovenox 40 s/c daily  Continuous telemetry monitoring  Bedrest and HOB flat for 24 hours  NPO until passes Lorenzo or cleared by SLP  PT/OT/SLP to evaluate after 24 hour bedrest completed (from symptom onset)    Further recommendations may follow per MD             VTE Risk Mitigation (From admission, onward)           Ordered     Reason for No Pharmacological VTE Prophylaxis  Once        Question:  Reasons:  Answer:  Risk of Bleeding    03/13/24 1340     IP VTE HIGH RISK PATIENT  Once         03/13/24 1339     Place sequential compression device  Until discontinued         03/13/24 1339                    Thank you for your consult.  Will follow up with patient.    Darlin Alston NP  Neurology  [facility] - Emergency Dept

## 2024-03-13 NOTE — H&P
[facility] Hospital Medicine History & Physical Examination       Patient Name: Carlton Johnson Jr.  MRN: [id number]  Patient Class: IP- Inpatient   Admission Date: 3/13/2024   Admitting Physician: Arielle Burrell MD   Length of Stay: 0  Attending Physician: Arielle Burrell MD   Primary Care Provider: Radha George MD   Face-to-Face encounter date: 03/13/2024  Code Status: Full Code   Chief Complaint: Weakness (Weakness & dizziness that started this morning. Multiple falls since last night. Hit head. -LOC & -BT. )        Patient information was obtained from patient, patient's family, past medical records and ER records.     HISTORY OF PRESENT ILLNESS:   Carlton Johnson Jr. is a 73 y.o. White male with a past medical history of hypertension, hyperlipidemia, coronary artery disease status post stents no longer on Plavix, vertigo and BPH. The patient presented to [facility] on 3/13/2024 with a primary complaint of generalized weakness, dizziness and gait imbalance.  Patient went to bed around 8:30/9:00 AM in a normal state of health.  He had 2 falls in the middle of the night on 3/12/2024. Upon waking up at 1:30 AM today (3/13/2024)  he was experiencing dizziness and generalized weakness.  Patient went back to bed and woke up again a 7:30 AM when he had another fall.  Patient did hit his head on the night stand on one of the 3 falls.  Wife at bedside reports patient has been ambulating to the left.  Patient had to use a walker throughout the night to ambulate due to weakness and gait imbalance.  At baseline patient lives with wife, ambulates independently and is active tending to livestock.  Patient denies complaints of shortness of breath, chest pain, facial droop, slurred speech, focal weakness, headache, vision changes, abdominal pain, nausea, vomiting and diarrhea.  He reports symptoms or improving but are not at baseline.  Patient experiences weakness to the left lower  extremity chronically due to needing a left knee replacement.    Upon presentation to the ED, temperature 97.7° F, heart rate 82, blood pressure 132/90, respiratory rate 18 and SpO2 98% on room air.  Labs with WBC 13.21, troponin less than 0.01. UA negative for infection.  EKG normal sinus rhythm and age undetermined septal infarct.  Chest x-ray negative for acute cardiopulmonary process.  CT of the head with no acute intracranial abnormality but chronic microvascular ischemic changes.  CT cervical spine with no acute fracture.  Patient is admitted to hospital medicine services for further medical management.    PAST MEDICAL HISTORY:     Past Medical History:   Diagnosis Date    Heart attack    Hypertension  Hyperlipidemia  BPH  Vertigo    PAST SURGICAL HISTORY:     Past Surgical History:   Procedure Laterality Date    CORONARY ANGIOPLASTY WITH STENT PLACEMENT     Right knee replacement   Colonoscopy   Cardiac stent    ALLERGIES:   Patient has no known allergies.    FAMILY HISTORY:   Father: CTI    SOCIAL HISTORY:   Denies tobacco, alcohol and illicit drug use    HOME MEDICATIONS:     Prior to Admission medications    Medication Sig Start Date End Date Taking? Authorizing Provider   aspirin 81 MG Chew Take 81 mg by mouth.   Yes Provider, Historical   atorvastatin (LIPITOR) 20 MG tablet Take 20 mg by mouth once daily.   Yes Provider, Historical   ezetimibe (ZETIA) 10 mg tablet Take 10 mg by mouth.   Yes Provider, Historical   multivit-min-folic-vit K-lycop 400- mcg Tab Take 1 tablet by mouth once daily.   Yes Provider, Historical   naproxen (EC NAPROSYN) 500 MG EC tablet Take 500 mg by mouth 2 (two) times daily with meals.   Yes Provider, Historical   pantoprazole (PROTONIX) 40 MG tablet Take 40 mg by mouth 2 (two) times daily before meals.   Yes Provider, Historical   ranolazine (RANEXA) 500 MG Tb12 Take 500 mg by mouth 2 (two) times daily.   Yes Provider, Historical   tamsulosin (FLOMAX) 0.4 mg Cap Take 0.4  mg by mouth once daily.   Yes Provider, Historical   telmisartan (MICARDIS) 80 MG Tab Take 80 mg by mouth.   Yes Provider, Historical   triamterene-hydrochlorothiazide 37.5-25 mg (MAXZIDE-25) 37.5-25 mg per tablet Take 1 tablet by mouth once daily.   Yes Provider, Historical   vitamin D (VITAMIN D3) 1000 units Tab Take 1,000 Units by mouth once daily.   Yes Provider, Historical   aluminum-magnesium hydroxide-simethicone (MAALOX) 200-200-20 mg/5 mL Susp Take 15 mLs by mouth.    Provider, Historical   clopidogreL (PLAVIX) 75 mg tablet Take 75 mg by mouth.    Provider, Historical   finasteride (PROSCAR) 5 mg tablet Take 5 mg by mouth.    Provider, Historical   metoprolol tartrate (LOPRESSOR) 25 MG tablet Take 12.5 mg by mouth 2 (two) times daily.    Provider, Historical   nitroGLYCERIN (NITROSTAT) 0.4 MG SL tablet Place 0.4 mg under the tongue.    Provider, Historical   rosuvastatin (CRESTOR) 5 MG tablet Take 5 mg by mouth every evening.    Provider, Historical       REVIEW OF SYSTEMS:   Except as documented, all other systems reviewed and negative     PHYSICAL EXAM:     VITAL SIGNS: 24 HRS MIN & MAX LAST   Temp  Min: 97.7 °F (36.5 °C)  Max: 97.7 °F (36.5 °C) 97.7 °F (36.5 °C)   BP  Min: 120/71  Max: 134/85 132/73   Pulse  Min: 80  Max: 92  92   Resp  Min: 18  Max: 18 18   SpO2  Min: 96 %  Max: 100 % 96 %       General appearance: Well-developed, well-nourished male in no apparent distress.  Wife at bedside.  HEENT: Atraumatic head. Moist mucous membranes of oral cavity.  Lungs: Clear to auscultation bilaterally.   Heart: Regular rate and rhythm.   Abdomen: Soft, non-distended.   Extremities: No cyanosis, clubbing. No deformities.  Skin: No Rash. Warm and dry.  Neuro: Awake, alert and oriented.  No facial droop.  No pronator drift.  Normal speech.  Left lower extremity 4/5 weakness.  Left upper extremity and right extremities 5/5 strength.  Psych/mental status: Appropriate mood and affect. Cooperative. Responds  appropriately to questions.       LABS AND IMAGING:     Recent Labs   Lab 03/13/24  1048   WBC 13.21*   RBC 5.21   HGB 15.4   HCT 45.3   MCV 86.9   MCH 29.6   MCHC 34.0   RDW 13.5      MPV 10.1       Recent Labs   Lab 03/13/24  1048      K 3.9   CO2 26   BUN 18.5   CREATININE 1.13   CALCIUM 9.2   MG 1.90   ALBUMIN 4.1   ALKPHOS 81   ALT 17   AST 16   BILITOT 0.7       Microbiology Results (last 7 days)       ** No results found for the last 168 hours. **             X-Ray Chest AP Portable  Narrative: EXAMINATION:  XR CHEST AP PORTABLE    CLINICAL HISTORY:  Weakness    TECHNIQUE:  One view    COMPARISON:  None available.    FINDINGS:  Cardiopericardial silhouette is within normal limits.  No acute dense focal or segmental consolidation, congestive process, pleural effusions or pneumothorax.  Scoliosis.  Impression: NO ACUTE CARDIOPULMONARY PROCESS IDENTIFIED.    Electronically signed by: Albert Petersen  Date:    03/13/2024  Time:    11:49  CT Head Without Contrast  Narrative: EXAMINATION:  CT HEAD WITHOUT CONTRAST    CLINICAL HISTORY:  Head trauma, moderate-severe;    TECHNIQUE:  Axial scans were obtained from skull base to the vertex.    Coronal and sagittal reconstructions obtained from the axial data.    Automatic exposure control was utilized to limit radiation dose.    Contrast: None    Radiation Dose:    Total DLP: 1361 mGy*cm    COMPARISON:  None    FINDINGS:  There is no acute intracranial hemorrhage or edema. The gray-white matter differentiation is preserved.  Scattered hypodensities in the subcortical and periventricular white matter likely represent chronic microvascular ischemic changes.    There is no mass effect or midline shift.  There is diffuse parenchymal volume loss.  The basal cisterns are patent. There is no abnormal extra-axial fluid collection.  Scattered carotid artery calcifications are noted.    The calvarium and skull base are intact.  There is trace scattered paranasal sinus  mucosal thickening.  Impression: 1. No acute intracranial abnormality.  2. Chronic microvascular ischemic changes.    Electronically signed by: Rosenda Eden  Date:    03/13/2024  Time:    11:29  CT Cervical Spine Without Contrast  Narrative: EXAMINATION:  CT CERVICAL SPINE WITHOUT CONTRAST    CLINICAL HISTORY:  Neck trauma (Age >= 65y);    TECHNIQUE:  Noncontrast CT images of the cervical spine. Axial, coronal, and sagittal reformatted images were obtained. Dose length product is 1361 mGycm. Automatic exposure control, adjustment of mA/kV or iterative reconstruction technique was used to limit radiation dose.    COMPARISON:  None    FINDINGS:  The cervical spine is visualized through the level of T1.    There is no acute fracture identified.  There are multilevel degenerative changes with disc height loss, marginal osteophyte formation and facet arthropathy.  There is no paraspinal hematoma.  Impression: No acute fracture identified.    Electronically signed by: Rosenda Eden  Date:    03/13/2024  Time:    11:28        ASSESSMENT & PLAN:   Assessment:  Dizziness, weakness and ataxia, CVA rule out   Leukocytosis   History of hypertension, hyperlipidemia, coronary artery disease status post stents no longer on Plavix, vertigo and BPH    Plan:  - Neurology consulted. Appreciate recommendations  - CVA work up:  > MRI Brain - ordered  > CTA Head and Neck - no hemodynamically significant flow limiting stenosis  > Echo - ordered  > Carotid US -  preliminary read with less than 50% stenosis bilateral ICA and bilateral vertebral arteries patent with antegrade flow  > Hemoglobin A1C - 5.8  > CRP 1.4, sed rate 4  > Lipid Panel - within limits  > UDS - ordered  > Physical, Occupation and Speech Therapy consulted  > Permissive hypertension, IV Labetalol and Hydralazine as needed for SBP >220 or DBP> 120  - Resume appropriate home medications when deemed necessary   - Labs in AM      VTE Prophylaxis: will be placed on SCD  for DVT prophylaxis and will be advised to be as mobile as possible and sit in a chair as tolerated      __________________________________________________________________________  INPATIENT LIST OF MEDICATIONS     Current Facility-Administered Medications:     bisacodyL suppository 10 mg, 10 mg, Rectal, Daily PRN, Millie Hamm PA-C    labetaloL injection 10 mg, 10 mg, Intravenous, Q15 Min PRN, Millie Hamm PA-C    sodium chloride 0.9% flush 10 mL, 10 mL, Intravenous, Q8H, Millie Hamm PA-C    Current Outpatient Medications:     aspirin 81 MG Chew, Take 81 mg by mouth., Disp: , Rfl:     atorvastatin (LIPITOR) 20 MG tablet, Take 20 mg by mouth once daily., Disp: , Rfl:     ezetimibe (ZETIA) 10 mg tablet, Take 10 mg by mouth., Disp: , Rfl:     multivit-min-folic-vit K-lycop 400- mcg Tab, Take 1 tablet by mouth once daily., Disp: , Rfl:     naproxen (EC NAPROSYN) 500 MG EC tablet, Take 500 mg by mouth 2 (two) times daily with meals., Disp: , Rfl:     pantoprazole (PROTONIX) 40 MG tablet, Take 40 mg by mouth 2 (two) times daily before meals., Disp: , Rfl:     ranolazine (RANEXA) 500 MG Tb12, Take 500 mg by mouth 2 (two) times daily., Disp: , Rfl:     tamsulosin (FLOMAX) 0.4 mg Cap, Take 0.4 mg by mouth once daily., Disp: , Rfl:     telmisartan (MICARDIS) 80 MG Tab, Take 80 mg by mouth., Disp: , Rfl:     triamterene-hydrochlorothiazide 37.5-25 mg (MAXZIDE-25) 37.5-25 mg per tablet, Take 1 tablet by mouth once daily., Disp: , Rfl:     vitamin D (VITAMIN D3) 1000 units Tab, Take 1,000 Units by mouth once daily., Disp: , Rfl:     aluminum-magnesium hydroxide-simethicone (MAALOX) 200-200-20 mg/5 mL Susp, Take 15 mLs by mouth., Disp: , Rfl:     clopidogreL (PLAVIX) 75 mg tablet, Take 75 mg by mouth., Disp: , Rfl:     finasteride (PROSCAR) 5 mg tablet, Take 5 mg by mouth., Disp: , Rfl:     metoprolol tartrate (LOPRESSOR) 25 MG tablet, Take 12.5 mg by mouth 2 (two) times daily., Disp: , Rfl:      nitroGLYCERIN (NITROSTAT) 0.4 MG SL tablet, Place 0.4 mg under the tongue., Disp: , Rfl:     rosuvastatin (CRESTOR) 5 MG tablet, Take 5 mg by mouth every evening., Disp: , Rfl:       Scheduled Meds:   sodium chloride 0.9%  10 mL Intravenous Q8H     Continuous Infusions:  PRN Meds:.bisacodyL, labetalol      Discharge Planning and Disposition: Anticipated discharge to be determined.    I, BRISEIDA Bradshaw, have reviewed and discussed the case with Dr. Arielle Burrell MD    Please see the following addendum for further assessment and plan from there attending MD.    Millie Hamm PA-C  03/13/2024    MD Addendum:  I, Dr. Arielle Burrell MD assumed care of this patient today 3/13/24 4 PM  For the patient encounter, I performed the substantive portion of the visit, I reviewed the NP/PA documentation, treatment plan, and medical decision making.  I had face to face time with this patient .     73-year-old male with a history of CAD status post stents, hypertension, vertigo, right knee replacement presented to ED for evaluation of falls, he was accompanied by his wife was at bedside contributed to history.    Patient in his usual state of health until last night when attempted to use restroom sustained a fall did not lose consciousness then again after he woke up this morning , sustain another fall then presented to ER for evaluation.  Patient denied any chest pain, shortness on breath, dizziness, palpitations any focal weakness.  Patient independent prior to falls.    CT head without contrast with no acute intracranial abnormality, CTA head and neck with no hemodynamic significant flow-limiting stenosis    On my exam, patient hemodynamically stable  Neuro: patient alert oriented able to move all extremities with good strength grossly no focal neuro deficits speech clear, no facial droop  Integumentary: surgical scar right knee  Lungs: clear to auscultate  heart :regular rate and rhythm   Abdomen: Soft  nontender    MDM  R/o posterior stroke  -CT head without contrast no acute intracranial abnormality    Stroke workup, monitor on tele   Ordered Valium p.o. for claustrophobia Patient just had MRI, follow up results  Neurology team evaluated the patient recommended aspirin 325 , patient takes aspirin 81 at home    follow up with neuro recommendations  Allow permissive hypertension   Physical therapy  Home meds reviewed, resumed appropriate    Code status: Full code

## 2024-03-13 NOTE — Clinical Note
Diagnosis: Unsteady gait [198255]   Future Attending Provider: MYRIAM SOL [[id number]]   Admit to which facility:: [facility] [[id number]]   Reason for IP Medical Treatment  (Clinical interventions that can only be accomplished in the IP setting? ) :: cva workup, pt/ot   I certify that Inpatient services for greater than or equal to 2 midnights are medically necessary:: Yes   Plans for Post-Acute care--if anticipated (pick the single best option):: A. No post acute care anticipated at this time   Special Needs:: No Special Needs [1]

## 2024-03-14 LAB
ALBUMIN SERPL-MCNC: 3.6 G/DL (ref 3.4–4.8)
ALBUMIN/GLOB SERPL: 1.2 RATIO (ref 1.1–2)
ALP SERPL-CCNC: 71 UNIT/L (ref 40–150)
ALT SERPL-CCNC: 14 UNIT/L (ref 0–55)
APTT PPP: 25.1 SECONDS (ref 23.2–33.7)
AST SERPL-CCNC: 15 UNIT/L (ref 5–34)
BASOPHILS # BLD AUTO: 0.07 X10(3)/MCL
BASOPHILS NFR BLD AUTO: 0.7 %
BILIRUB SERPL-MCNC: 0.9 MG/DL
BUN SERPL-MCNC: 13.6 MG/DL (ref 8.4–25.7)
CALCIUM SERPL-MCNC: 8.7 MG/DL (ref 8.8–10)
CHLORIDE SERPL-SCNC: 108 MMOL/L (ref 98–107)
CK MB SERPL-MCNC: 1.2 NG/ML
CO2 SERPL-SCNC: 23 MMOL/L (ref 23–31)
CREAT SERPL-MCNC: 0.95 MG/DL (ref 0.73–1.18)
EOSINOPHIL # BLD AUTO: 0.15 X10(3)/MCL (ref 0–0.9)
EOSINOPHIL NFR BLD AUTO: 1.6 %
ERYTHROCYTE [DISTWIDTH] IN BLOOD BY AUTOMATED COUNT: 14 % (ref 11.5–17)
GFR SERPLBLD CREATININE-BSD FMLA CKD-EPI: >60 MLS/MIN/1.73/M2
GLOBULIN SER-MCNC: 3 GM/DL (ref 2.4–3.5)
GLUCOSE SERPL-MCNC: 104 MG/DL (ref 82–115)
HCT VFR BLD AUTO: 44.5 % (ref 42–52)
HGB BLD-MCNC: 15 G/DL (ref 14–18)
IMM GRANULOCYTES # BLD AUTO: 0.07 X10(3)/MCL (ref 0–0.04)
IMM GRANULOCYTES NFR BLD AUTO: 0.7 %
INR PPP: 1
LEFT CCA DIST DIAS: 14 CM/S
LEFT CCA DIST SYS: 82 CM/S
LEFT CCA PROX DIAS: 15 CM/S
LEFT CCA PROX SYS: 123 CM/S
LEFT ECA DIAS: 9 CM/S
LEFT ECA SYS: 87 CM/S
LEFT ICA DIST DIAS: 28 CM/S
LEFT ICA DIST SYS: 71 CM/S
LEFT ICA MID DIAS: 11 CM/S
LEFT ICA MID SYS: 38 CM/S
LEFT ICA PROX DIAS: 13 CM/S
LEFT ICA PROX SYS: 83 CM/S
LEFT VERTEBRAL DIAS: 0 CM/S
LEFT VERTEBRAL SYS: 29 CM/S
LYMPHOCYTES # BLD AUTO: 2.61 X10(3)/MCL (ref 0.6–4.6)
LYMPHOCYTES NFR BLD AUTO: 27.6 %
MAGNESIUM SERPL-MCNC: 1.8 MG/DL (ref 1.6–2.6)
MCH RBC QN AUTO: 29.9 PG (ref 27–31)
MCHC RBC AUTO-ENTMCNC: 33.7 G/DL (ref 33–36)
MCV RBC AUTO: 88.8 FL (ref 80–94)
MONOCYTES # BLD AUTO: 0.81 X10(3)/MCL (ref 0.1–1.3)
MONOCYTES NFR BLD AUTO: 8.6 %
NEUTROPHILS # BLD AUTO: 5.73 X10(3)/MCL (ref 2.1–9.2)
NEUTROPHILS NFR BLD AUTO: 60.8 %
NRBC BLD AUTO-RTO: 0 %
OHS CV CAROTID RIGHT ICA EDV HIGHEST: 11
OHS CV CAROTID ULTRASOUND LEFT ICA/CCA RATIO: 1.01
OHS CV CAROTID ULTRASOUND RIGHT ICA/CCA RATIO: 0.63
OHS CV PV CAROTID LEFT HIGHEST CCA: 123
OHS CV PV CAROTID LEFT HIGHEST ICA: 83
OHS CV PV CAROTID RIGHT HIGHEST CCA: 82
OHS CV PV CAROTID RIGHT HIGHEST ICA: 52
OHS CV US CAROTID LEFT HIGHEST EDV: 28
PHOSPHATE SERPL-MCNC: 3.7 MG/DL (ref 2.3–4.7)
PLATELET # BLD AUTO: 219 X10(3)/MCL (ref 130–400)
PMV BLD AUTO: 10.5 FL (ref 7.4–10.4)
POTASSIUM SERPL-SCNC: 4 MMOL/L (ref 3.5–5.1)
PROT SERPL-MCNC: 6.6 GM/DL (ref 5.8–7.6)
PROTHROMBIN TIME: 13.1 SECONDS (ref 12.5–14.5)
RBC # BLD AUTO: 5.01 X10(6)/MCL (ref 4.7–6.1)
RIGHT CCA DIST DIAS: 17 CM/S
RIGHT CCA DIST SYS: 82 CM/S
RIGHT CCA PROX DIAS: 15 CM/S
RIGHT CCA PROX SYS: 73 CM/S
RIGHT ECA DIAS: 10 CM/S
RIGHT ECA SYS: 74 CM/S
RIGHT ICA DIST DIAS: 0 CM/S
RIGHT ICA DIST SYS: 43 CM/S
RIGHT ICA MID DIAS: 11 CM/S
RIGHT ICA MID SYS: 46 CM/S
RIGHT ICA PROX DIAS: 11 CM/S
RIGHT ICA PROX SYS: 52 CM/S
RIGHT VERTEBRAL DIAS: 11 CM/S
RIGHT VERTEBRAL SYS: 48 CM/S
SODIUM SERPL-SCNC: 139 MMOL/L (ref 136–145)
TROPONIN I SERPL-MCNC: 0.01 NG/ML (ref 0–0.04)
WBC # SPEC AUTO: 9.44 X10(3)/MCL (ref 4.5–11.5)

## 2024-03-14 PROCEDURE — 82553 CREATINE MB FRACTION: CPT | Performed by: PHYSICIAN ASSISTANT

## 2024-03-14 PROCEDURE — 97161 PT EVAL LOW COMPLEX 20 MIN: CPT

## 2024-03-14 PROCEDURE — 92523 SPEECH SOUND LANG COMPREHEN: CPT

## 2024-03-14 PROCEDURE — 83735 ASSAY OF MAGNESIUM: CPT | Performed by: PHYSICIAN ASSISTANT

## 2024-03-14 PROCEDURE — 85025 COMPLETE CBC W/AUTO DIFF WBC: CPT | Performed by: PHYSICIAN ASSISTANT

## 2024-03-14 PROCEDURE — 84100 ASSAY OF PHOSPHORUS: CPT | Performed by: PHYSICIAN ASSISTANT

## 2024-03-14 PROCEDURE — 21400001 HC TELEMETRY ROOM

## 2024-03-14 PROCEDURE — 99232 SBSQ HOSP IP/OBS MODERATE 35: CPT | Mod: ,,, | Performed by: PSYCHIATRY & NEUROLOGY

## 2024-03-14 PROCEDURE — 84484 ASSAY OF TROPONIN QUANT: CPT | Performed by: PHYSICIAN ASSISTANT

## 2024-03-14 PROCEDURE — 85610 PROTHROMBIN TIME: CPT | Performed by: PHYSICIAN ASSISTANT

## 2024-03-14 PROCEDURE — 25000003 PHARM REV CODE 250: Performed by: INTERNAL MEDICINE

## 2024-03-14 PROCEDURE — 97535 SELF CARE MNGMENT TRAINING: CPT

## 2024-03-14 PROCEDURE — 25000003 PHARM REV CODE 250: Performed by: PHYSICIAN ASSISTANT

## 2024-03-14 PROCEDURE — 25000003 PHARM REV CODE 250: Performed by: NURSE PRACTITIONER

## 2024-03-14 PROCEDURE — 97165 OT EVAL LOW COMPLEX 30 MIN: CPT

## 2024-03-14 PROCEDURE — 85730 THROMBOPLASTIN TIME PARTIAL: CPT | Performed by: PHYSICIAN ASSISTANT

## 2024-03-14 PROCEDURE — A4216 STERILE WATER/SALINE, 10 ML: HCPCS | Performed by: PHYSICIAN ASSISTANT

## 2024-03-14 PROCEDURE — 80053 COMPREHEN METABOLIC PANEL: CPT | Performed by: PHYSICIAN ASSISTANT

## 2024-03-14 RX ORDER — ACETAMINOPHEN 325 MG/1
650 TABLET ORAL EVERY 6 HOURS PRN
Status: DISCONTINUED | OUTPATIENT
Start: 2024-03-14 | End: 2024-03-15 | Stop reason: HOSPADM

## 2024-03-14 RX ADMIN — ACETAMINOPHEN 650 MG: 325 TABLET, FILM COATED ORAL at 06:03

## 2024-03-14 RX ADMIN — FINASTERIDE 5 MG: 5 TABLET, FILM COATED ORAL at 04:03

## 2024-03-14 RX ADMIN — ATORVASTATIN CALCIUM 80 MG: 40 TABLET, FILM COATED ORAL at 10:03

## 2024-03-14 RX ADMIN — SODIUM CHLORIDE, PRESERVATIVE FREE 10 ML: 5 INJECTION INTRAVENOUS at 06:03

## 2024-03-14 RX ADMIN — EZETIMIBE 10 MG: 10 TABLET ORAL at 04:03

## 2024-03-14 RX ADMIN — PANTOPRAZOLE SODIUM 40 MG: 40 TABLET, DELAYED RELEASE ORAL at 05:03

## 2024-03-14 RX ADMIN — PANTOPRAZOLE SODIUM 40 MG: 40 TABLET, DELAYED RELEASE ORAL at 04:03

## 2024-03-14 RX ADMIN — RANOLAZINE 500 MG: 500 TABLET, EXTENDED RELEASE ORAL at 08:03

## 2024-03-14 RX ADMIN — RANOLAZINE 500 MG: 500 TABLET, EXTENDED RELEASE ORAL at 10:03

## 2024-03-14 RX ADMIN — SODIUM CHLORIDE, PRESERVATIVE FREE 10 ML: 5 INJECTION INTRAVENOUS at 10:03

## 2024-03-14 RX ADMIN — TAMSULOSIN HYDROCHLORIDE 0.4 MG: 0.4 CAPSULE ORAL at 08:03

## 2024-03-14 NOTE — SUBJECTIVE & OBJECTIVE
Subjective:     Interval History: No acute events overnight. Patient sitting up in bed. Wife present at bedside. He tells me that he is much better. He does have a mild HA 2/10, but improved after coffee and some tylenol.     Current Neurological Medications:     Current Facility-Administered Medications   Medication Dose Route Frequency Provider Last Rate Last Admin    acetaminophen tablet 650 mg  650 mg Oral Q6H PRN Poly Christian AGACNP-BC   650 mg at 03/14/24 0613    atorvastatin tablet 80 mg  80 mg Oral Daily Arielle Burrell MD   80 mg at 03/13/24 1834    bisacodyL suppository 10 mg  10 mg Rectal Daily PRN Millie Hamm PA-C        ezetimibe tablet 10 mg  10 mg Oral Daily Arielle Burrell MD        finasteride tablet 5 mg  5 mg Oral Daily Arielle Burrell MD        labetaloL injection 10 mg  10 mg Intravenous Q15 Min PRN Millie Hamm PA-C        pantoprazole EC tablet 40 mg  40 mg Oral BID AC Arielle Burrell MD   40 mg at 03/14/24 0546    ranolazine 12 hr tablet 500 mg  500 mg Oral BID Arielle Burrell MD   500 mg at 03/13/24 2119    sodium chloride 0.9% flush 10 mL  10 mL Intravenous Q8H Millie Hamm PA-C   10 mL at 03/14/24 0600    tamsulosin 24 hr capsule 0.4 mg  0.4 mg Oral Daily PM Arielle Burrell MD   0.4 mg at 03/13/24 2005       Review of Systems  Objective:     Vital Signs (Most Recent):  Temp: 96.9 °F (36.1 °C) (03/14/24 0816)  Pulse: 74 (03/14/24 0816)  Resp: 16 (03/14/24 0816)  BP: 111/75 (03/14/24 0816)  SpO2: 96 % (03/14/24 0816) Vital Signs (24h Range):  Temp:  [96.9 °F (36.1 °C)-98.6 °F (37 °C)] 96.9 °F (36.1 °C)  Pulse:  [69-92] 74  Resp:  [16-18] 16  SpO2:  [95 %-100 %] 96 %  BP: (111-144)/(69-96) 111/75     Weight: 90.7 kg (200 lb)  Body mass index is 31.32 kg/m².     Physical Exam  Constitutional:       Appearance: Normal appearance.   HENT:      Head: Normocephalic and atraumatic.      Nose: Nose normal.      Mouth/Throat:      Mouth: Mucous  membranes are moist.   Eyes:      Extraocular Movements: Extraocular movements intact.      Pupils: Pupils are equal, round, and reactive to light.   Pulmonary:      Effort: Pulmonary effort is normal.   Abdominal:      Palpations: Abdomen is soft.   Musculoskeletal:         General: Normal range of motion.      Cervical back: Normal range of motion and neck supple.   Skin:     General: Skin is warm.      Capillary Refill: Capillary refill takes less than 2 seconds.   Neurological:      General: No focal deficit present.      Mental Status: He is alert and oriented to person, place, and time.      Cranial Nerves: No cranial nerve deficit.      Sensory: No sensory deficit.      Motor: No weakness.   Psychiatric:         Mood and Affect: Mood normal.          NEUROLOGICAL EXAMINATION:     MENTAL STATUS   Oriented to person, place, and time.     CRANIAL NERVES     CN III, IV, VI   Pupils are equal, round, and reactive to light.      Significant Labs: CBC:   Recent Labs   Lab 03/13/24  1048 03/14/24  0500   WBC 13.21* 9.44   HGB 15.4 15.0   HCT 45.3 44.5    219     CMP:   Recent Labs   Lab 03/13/24  1048 03/14/24  0500    139   K 3.9 4.0   CO2 26 23   BUN 18.5 13.6   CREATININE 1.13 0.95   CALCIUM 9.2 8.7*   MG 1.90 1.80   ALBUMIN 4.1 3.6   BILITOT 0.7 0.9   ALKPHOS 81 71   AST 16 15   ALT 17 14       Significant Imaging: I have reviewed all pertinent imaging results/findings within the past 24 hours.

## 2024-03-14 NOTE — PT/OT/SLP EVAL
[facility]  Speech Language Pathology Department  Cognitive-Communication Evaluation    Patient Name:  Carlton Johnson Jr.   MRN:  [id number]    Recommendations     General recommendations:  SLP intervention not indicated  Communication strategies:  none  Discharge therapy intensity: No Therapy Indicated  Barriers to safe discharge: none    History     Carlton Johnson Jr. is a/n 73 y.o. male admitted with generalized weakness and dizziness. CT head unremarkable. Pt passed swallow screener.     Past Medical History:   Diagnosis Date    Heart attack      Past Surgical History:   Procedure Laterality Date    CORONARY ANGIOPLASTY WITH STENT PLACEMENT       Previous level of Function  Education: 9th grade  Occupation: part time job  Lives: with spouse  Handed: Right  Glasses: no  Hearing Aids: no    Imaging   Results for orders placed during the hospital encounter of 03/13/24    MRI Brain Without Contrast    Narrative  EXAMINATION:  MRI BRAIN WITHOUT CONTRAST    CLINICAL HISTORY:  Stroke, follow up;    TECHNIQUE:  Multiplanar multisequence MR imaging of the brain was performed without contrast.    COMPARISON:  CT scan dated 03/13/2020    FINDINGS:  No intracranial mass or lesion is seen.  There is an area of acute infarct seen in the right frontal lobe anteriorly and medially with other punctate areas of acute lacunar infarcts seen in the right frontal region at the cerebral convexity..  There is a focal area of hemorrhagic transformation is seen in the punctate area of acute infarct at the cerebral convexity posteriorly best seen on image number 7 series 8 and image number 7 series 3.  There is diffuse cerebral atrophy seen along with some compensatory ventricular dilatation and periventricular white matter change consistent with patient's age.  Posterior fossa appears normal.  Calvarium is intact.  Paranasal sinuses appear grossly unremarkable.    Impression  Acute areas of punctate  infarct in the frontal lobe on the right side as outlined above with focal area of hemorrhagic transformation in the posterior frontal infarct at the cerebral convexity as outlined above    Chronic age related changes    This report was flagged in Epic as abnormal.      Electronically signed by: Yoandy Avila  Date:    03/13/2024  Time:    16:23    Subjective     Patient awake and alert.  Spiritual/Cultural/Orthodoxy Beliefs/Practices that affect care:  no  Pain/Comfort:  0/10  Respiratory Status: room air    Objective     ORAL MUSCULATURE  Dentition: own teeth  Facial Movement: WFL  Buccal Strength & Mobility: WFL  Mandibular Strength & Mobility: WFL    SPEECH PRODUCTION  Phoneme Production: adequate  Voice Quality: adequate  Voice Production: adequate  Speech Rate: appropriate  Loudness: acceptable  Speech Intelligibility  Known Context: Greater that 90%  Unknown Context: Greater that 90%    AUDITORY COMPREHENSION  Identification:  Body parts: Within Functional Limits  Objects: Within Functional Limits  Following Directions:  1-Step: Within Functional Limits  2-Step: Within Functional Limits  Yes/No Questions:  Biographical: Within Functional Limits  Environmental: Within Functional Limits  Simple: Within Functional Limits  Complex: Within Functional Limits    VERBAL EXPRESSION  Automatic Speech:  Functional needs: Within Functional Limits  Days of the week: Within Functional Limits  Months of the year: Within Functional Limits  Repetition:  Phonemes: Within Functional Limits  Single syllable words: Within Functional Limits  Multi-syllabic words: Within Functional Limits  Phrases: Within Functional Limits  Phrase Completion: Within Functional Limits  Confrontation Naming  Body Parts: Within Functional Limits  Objects: Within Functional Limits  Wh- Questions:  Object name: Within Functional Limits  Object function: Within Functional Limits    COGNITION  Orientation:  Person: yes  Place: yes  Time: yes  Situation:  yes   Attention:  Focused: Within Functional Limits  Sustained: Within Functional Limits  Memory:  Immediate: Within Functional Limits  Delayed: Within Functional Limits  Problem Solving  Functional simple: Within Functional Limits  Functional complex: Within Functional Limits  Organization:  Convergent thinking: Within Functional Limits  Divergent thinking: Within Functional Limits  Executive Function:  Initiation: Within Functional Limits  Planning: Within Functional Limits    Assessment     Pt presents with functional speech and language skills, cognitive linguistic skills note to be at baseline. No skilled SLP intervention is warranted at this time.       Patient Education     Patient provided with verbal education regarding POC.  Understanding was verbalized.     Time Tracking     SLP Treatment Date:   03/14/24  Speech Start Time:  1300  Speech Stop Time:  1315     Speech Total Time (min):  15 min    Billable minutes:  Evaluation of Speech Sound Production with Comprehension and Expression, 15 minutes     03/14/2024

## 2024-03-14 NOTE — ASSESSMENT & PLAN NOTE
Rule out stroke  -presented with dizziness  RF: CAD  Etiology: TBD    Stroke workup   -CTh: negative  -MRI brain: MRI revealing for an area of acute infarct seen in the right frontal lobe anteriorly and medially with other punctate areas of acute lacunar infarcts seen in the right frontal region at the cerebral convexity.. There is a focal area of hemorrhagic transformation is seen in the punctate area of acute infarct at the cerebral convexity posteriorly best seen on image number 7 series 8 and image number 7 series 3.  -CTA h/n: negative   -ECHO:  -CUS: negative  -LDL: 63  -TSH: 0.77  -home medications:  Aspirin 81 mg daily       Plan:  ECHO pending  SBP < 140  Continue home atorvastatin  80 mg daily  Appreciate PT/OT evaluation.   Instructed patient not to return to work for two weeks.  Will need to be on DAPT. Repeat CT head in a week, if stable ok to start antiplatelets.   CT head ordered 3/20  Follow up in stroke clinic in 3 months with Amanda Jones NP.  No further recommendations from a stroke stand point. Will sign off.

## 2024-03-14 NOTE — PROGRESS NOTES
[facility] - Neu[facility]  Neurology  Progress Note    Patient Name: Carlton Johnson Jr.  MRN: [id number]  Admission Date: 3/13/2024  Hospital Length of Stay: 1 days  Code Status: Full Code   Attending Provider: Arielle Burrell MD  Primary Care Physician: Brittney, Primary Doctor   Principal Problem:<principal problem not specified>    HPI:   Carlton Johnson is a 73 year-old male with pat medical history of MI s/p cardiac stents and vertigo who presented to the ED with complaints of generalized weakness and dizziness. Patient reported falling out of bed and and striking his head. He endorsed dizziness with any movement and an inability to walk. Per chart, he states that this dizziness is different from episodes of vertigo in the past.  CT head unremarkable. NSR on EKG. Labs unremarkable. Patient was afebrile and hemodynamically stable.     Overview/Hospital Course:  No notes on file        Subjective:     Interval History: No acute events overnight. Patient sitting up in bed. Wife present at bedside. He tells me that he is much better. He does have a mild HA 2/10, but improved after coffee and some tylenol.     Current Neurological Medications:     Current Facility-Administered Medications   Medication Dose Route Frequency Provider Last Rate Last Admin    acetaminophen tablet 650 mg  650 mg Oral Q6H PRN Poly Christian, AGACNP-BC   650 mg at 03/14/24 0613    atorvastatin tablet 80 mg  80 mg Oral Daily Arielle Burrell MD   80 mg at 03/13/24 1834    bisacodyL suppository 10 mg  10 mg Rectal Daily PRN Millie Hamm PA-C        ezetimibe tablet 10 mg  10 mg Oral Daily Arielle Burrell MD        finasteride tablet 5 mg  5 mg Oral Daily Arielle Burrell MD        labetaloL injection 10 mg  10 mg Intravenous Q15 Min PRN Millie Hamm PA-C        pantoprazole EC tablet 40 mg  40 mg Oral BID AC Arielle Burrell MD   40 mg at 03/14/24 0546    ranolazine 12 hr tablet 500 mg  500 mg Oral BID  Arielle Burrell MD   500 mg at 03/13/24 2119    sodium chloride 0.9% flush 10 mL  10 mL Intravenous Q8H Millie Hamm PA-C   10 mL at 03/14/24 0600    tamsulosin 24 hr capsule 0.4 mg  0.4 mg Oral Daily PM Arielle Burrell MD   0.4 mg at 03/13/24 2005       Review of Systems  Objective:     Vital Signs (Most Recent):  Temp: 96.9 °F (36.1 °C) (03/14/24 0816)  Pulse: 74 (03/14/24 0816)  Resp: 16 (03/14/24 0816)  BP: 111/75 (03/14/24 0816)  SpO2: 96 % (03/14/24 0816) Vital Signs (24h Range):  Temp:  [96.9 °F (36.1 °C)-98.6 °F (37 °C)] 96.9 °F (36.1 °C)  Pulse:  [69-92] 74  Resp:  [16-18] 16  SpO2:  [95 %-100 %] 96 %  BP: (111-144)/(69-96) 111/75     Weight: 90.7 kg (200 lb)  Body mass index is 31.32 kg/m².     Physical Exam  Constitutional:       Appearance: Normal appearance.   HENT:      Head: Normocephalic and atraumatic.      Nose: Nose normal.      Mouth/Throat:      Mouth: Mucous membranes are moist.   Eyes:      Extraocular Movements: Extraocular movements intact.      Pupils: Pupils are equal, round, and reactive to light.   Pulmonary:      Effort: Pulmonary effort is normal.   Abdominal:      Palpations: Abdomen is soft.   Musculoskeletal:         General: Normal range of motion.      Cervical back: Normal range of motion and neck supple.   Skin:     General: Skin is warm.      Capillary Refill: Capillary refill takes less than 2 seconds.   Neurological:      General: No focal deficit present.      Mental Status: He is alert and oriented to person, place, and time.      Cranial Nerves: No cranial nerve deficit.      Sensory: No sensory deficit.      Motor: No weakness.   Psychiatric:         Mood and Affect: Mood normal.          NEUROLOGICAL EXAMINATION:     MENTAL STATUS   Oriented to person, place, and time.     CRANIAL NERVES     CN III, IV, VI   Pupils are equal, round, and reactive to light.      Significant Labs: CBC:   Recent Labs   Lab 03/13/24  1048 03/14/24  0500   WBC 13.21* 9.44   HGB  15.4 15.0   HCT 45.3 44.5    219     CMP:   Recent Labs   Lab 03/13/24  1048 03/14/24  0500    139   K 3.9 4.0   CO2 26 23   BUN 18.5 13.6   CREATININE 1.13 0.95   CALCIUM 9.2 8.7*   MG 1.90 1.80   ALBUMIN 4.1 3.6   BILITOT 0.7 0.9   ALKPHOS 81 71   AST 16 15   ALT 17 14       Significant Imaging: I have reviewed all pertinent imaging results/findings within the past 24 hours.  Assessment and Plan:     Dizziness   Rule out stroke  -presented with dizziness  RF: CAD  Etiology: TBD    Stroke workup   -CTh: negative  -MRI brain: MRI revealing for an area of acute infarct seen in the right frontal lobe anteriorly and medially with other punctate areas of acute lacunar infarcts seen in the right frontal region at the cerebral convexity.. There is a focal area of hemorrhagic transformation is seen in the punctate area of acute infarct at the cerebral convexity posteriorly best seen on image number 7 series 8 and image number 7 series 3.  -CTA h/n: negative   -ECHO:  -CUS: negative  -LDL: 63  -TSH: 0.77  -home medications:  Aspirin 81 mg daily       Plan:  ECHO pending  SBP < 140  Continue home atorvastatin  80 mg daily  Appreciate PT/OT evaluation.   Instructed patient not to return to work for two weeks.  Will need to be on DAPT. Repeat CT head in a week, if stable ok to start antiplatelets.   CT head ordered 3/20  Follow up in stroke clinic in 3 months with Amanda Jones NP.  No further recommendations from a stroke stand point. Will sign off.                    VTE Risk Mitigation (From admission, onward)           Ordered     Reason for No Pharmacological VTE Prophylaxis  Once        Question:  Reasons:  Answer:  Risk of Bleeding    03/13/24 1340     IP VTE HIGH RISK PATIENT  Once         03/13/24 1339     Place sequential compression device  Until discontinued         03/13/24 1339                    Darlin Alston NP  Neurology  [facility] - [facility]    I have seen/examined the  patient.  NP was scribe.  I agree with the findings unless outlined below:    Odell Piper MD  [facility]     I agree with the above assessment    Mental Status: Alert and oriented x3. Language is fluent with good comprehension.    Cranial Nerve: Pupils are equal, round, and reactive to light. Visual fields are intact to confrontation.  Ocular movements are intact. Face is symmetric at rest and with activation with intact sensation throughout. Hearing intact to finger rub bilaterally. Muscles of tongue and palate activate symmetrically. No dysarthria. Strength is full in sternocleidomastoid and trapezius bilaterally.    Motor: Muscle bulk and tone are normal. Strength is 5/5 in all four extremities both proximally and distally. Intact fine motor movements bilaterally. There is no pronator drift or satelliting on arm roll.    Sensory: Sensation is intact to light touch, pinprick, vibration, and proprioception throughout. Romberg is negative.    Reflexes: 2+ and symmetric at the biceps, triceps, brachioradialis, patella, and Achilles bilaterally. Plantar response is flexor bilaterally.    Coordination: No dysmetria on finger-nose-finger or heel-knee-shin. Normal rapid alternating movements. Fast finger tapping with normal amplitude and speed.

## 2024-03-14 NOTE — PT/OT/SLP EVAL
"Occupational Therapy   Evaluation and Discharge Note    Name: Carlton Johnson Jr.  MRN: [id number]  Admitting Diagnosis: dizziness and generalized weakness/ fall  Recent Surgery: * No surgery found *     Recommendations:     Discharge therapy intensity: No Therapy Indicated   Discharge Equipment Recommendations: none  Barriers to discharge:  None    Assessment:     Carlton Johnson Jr. is a 73 y.o. male with a medical diagnosis of dizziness, underwent CVA workup with MRI revealing acute R frontal lobe and lacunar infarcts with hemorrhagic transformation in punctate area of acute infarct. On eval, patient presents with equal BUE strength, intact coordination with exception of slight ataxia with finger opposition at L hand, intact sensation and vision. Pt Independent with functional mobility, toileting, dressing, and showering. OT assisting with set up of shower and pt completed showering Ind with wife present. Pt is currently functioning at his baseline level and has no ADL concerns. Pt and wife reporting his symptoms and weakness have fully resolved. Skilled OT services are not warranted at this time.    Plan:     OT to sign off as acute OT services are not warranted at this time.  Please re-consult if situation changes during this hospitalization.    Plan of Care Reviewed with: patient, spouse    Subjective     Chief Complaint: "I need to lose some weight" when donning socks EOB  Patient/Family Comments/goals: to return to PLOF    Occupational Profile:  Living Environment: lives with wife in [facility] with 19 JESS and bilateral rails; has walk in shower with seat  Previous level of function: Independent  Roles and Routines: works as , , personal ADLs/IADLs  Equipment Used at Home: shower chair  Assistance upon Discharge: wife    Pain/Comfort:  Pain Rating 1: 0/10    Patients cultural, spiritual, Latter-day conflicts given the current situation: no    Objective:     OT communicated with RN prior to " session.      Patient was found HOB elevated with pulse ox (continuous), telemetry upon OT entry to room.    General Precautions: Standard, fall, other (see comments) (SBP<140)  Orthopedic Precautions: N/A  Braces: N/A    Vital Signs: Blood Pressure: 123/80  HR: 62  Respiratory Status: on room air    Bed Mobility:    Patient completed Supine to Sit with independence  Patient completed Sit to Supine with independence    Functional Mobility/Transfers:  Patient completed Sit <> Stand Transfer with independence  with  no assistive device   Patient completed Toilet Transfer Step Transfer technique with independence with  no AD  Patient completed  Shower Transfer Step Transfer technique with independence with no AD  Functional Mobility: Independent with mobility with no AD    Activities of Daily Living:  Grooming: independence standing at sink combing hair  Bathing: independence    Upper Body Dressing: independence doffing/donning t-shirt  Lower Body Dressing: independence    Toileting: independence      Functional Cognition:  Orientation: oriented to Person, Place, Time, and Situation  Safety Awareness: WFL    Visual Perceptual Skills:  Pursuits: WFL  Convergence/Divergence: WFL  Visual Fields: WFL    Upper Extremity Function:  Right Upper Extremity:   Strength: WFL  Coordination: WFL finger opposition and finger to nose  Sensation: WFL    Left Upper Extremity:  Strength: WFL  Coordination: WFL finger to nose; slightly ataxic with finger opposition  Sensation: WFL    Balance:   Static sitting balance: WFL  Dynamic sitting balance:WFL  Static standing balance:WFL  Dynamic standing balance:WFL    Therapeutic Positioning  Risk for acquired pressure injuries is decreased due to ability to mobilize independently .    OT interventions performed during the course of today's session in an effort to prevent and/or reduce acquired pressure injuries:   Education was provided on benefits of and recommendations for therapeutic  positioning    Skin assessment: all bony prominences were assessed    Findings: no redness or breakdown noted    OT recommendations for therapeutic positioning throughout hospitalization:   Follow [facility] Pressure Injury Prevention Protocol    Additional Treatment:  ADL Training: education on safe showering and safety with ADLs for return to home    Patient Education:  Patient and spouse were provided with verbal education education regarding OT role/goals/POC, fall prevention, safety awareness, and Discharge/DME recommendations.  Understanding was verbalized.     Patient left HOB elevated with all lines intact, call button in reach, RN notified, and wife present.    History:     Past Medical History:   Diagnosis Date    Heart attack          Past Surgical History:   Procedure Laterality Date    CORONARY ANGIOPLASTY WITH STENT PLACEMENT         Time Tracking:     OT Date of Treatment: 03/14/24  OT Start Time: 1136  OT Stop Time: 1210  OT Total Time (min): 34 min    Billable Minutes:Evaluation 14 mins  Self Care/Home Management 20 mins    3/14/2024

## 2024-03-14 NOTE — NURSING
Nurses Note -- 4 Eyes      3/14/2024   3:51 AM      Skin assessed during: Admit      [] No Altered Skin Integrity Present    []Prevention Measures Documented      [x] Yes- Altered Skin Integrity Present or Discovered   [] LDA Added if Not in Epic (Describe Wound)   [] New Altered Skin Integrity was Present on Admit and Documented in LDA   [x] Wound Image Taken    Wound Care Consulted? No    Attending Nurse:  Jeet Gayle RN/Staff Member:   Umer

## 2024-03-14 NOTE — PLAN OF CARE
03/14/24 1522   Discharge Assessment   Assessment Type Discharge Planning Assessment   Confirmed/corrected address, phone number and insurance Yes   Confirmed Demographics Correct on Facesheet   Source of Information patient   When was your last doctors appointment?   (Dr Radha Geroge is PCP)   Communicated GEOFF with patient/caregiver Date not available/Unable to determine   Reason For Admission Weakness  (primary Dx) Generalized weaknes   People in Home spouse  (Myrna Johnson is spouse)   Do you expect to return to your current living situation? Yes   Do you have help at home or someone to help you manage your care at home? No   Prior to hospitilization cognitive status: Alert/Oriented   Current cognitive status: Alert/Oriented   Walking or Climbing Stairs Difficulty no   Dressing/Bathing Difficulty no   Do you have any problems with:   (Spouse and self do errands and shopping)   Home Accessibility stairs to enter home   Number of Stairs, Main Entrance other (see comments)  (19 steps to enter home)   Surface of Stairs, Main Entrance concrete   Stair Railings, Main Entrance railings safe and in good condition;railings on both sides of stairs   Home Layout Able to live on 1st floor   Equipment Currently Used at Home CPAP;blood pressure machine;walker, rolling   Readmission within 30 days? No   Patient currently being followed by outpatient case management? No   Do you currently have service(s) that help you manage your care at home? No   Do you take prescription medications? Yes   Do you have prescription coverage? Yes   Coverage [facility]  (fills prescriptions at [facility] in [place])   Do you have any problems affording any of your prescribed medications? No   Is the patient taking medications as prescribed? no   If no, which medications is patient not taking? forgets to take a dose every now and then. Suggested to set alarm to remind   Who is going to help you get home at discharge?  Spouse -- Myrna Alex   How do you get to doctors appointments? car, drives self   Are you on dialysis? No   Do you take coumadin? No   Discharge Plan A Home with family;Home   Discharge Plan B Home;Home with family   DME Needed Upon Discharge  none   Discharge Plan discussed with: Patient   Transition of Care Barriers None   Physical Activity   On average, how many days per week do you engage in moderate to strenuous exercise (like a brisk walk)? 0 days   On average, how many minutes do you engage in exercise at this level? 0 min   Financial Resource Strain   How hard is it for you to pay for the very basics like food, housing, medical care, and heating? Not very   Housing Stability   In the last 12 months, was there a time when you were not able to pay the mortgage or rent on time? N   In the last 12 months, how many places have you lived? 1   In the last 12 months, was there a time when you did not have a steady place to sleep or slept in a shelter (including now)? N   Transportation Needs   In the past 12 months, has lack of transportation kept you from medical appointments or from getting medications? no   In the past 12 months, has lack of transportation kept you from meetings, work, or from getting things needed for daily living? No   Food Insecurity   Within the past 12 months, you worried that your food would run out before you got the money to buy more. Never true   Within the past 12 months, the food you bought just didn't last and you didn't have money to get more. Never true   Stress   Do you feel stress - tense, restless, nervous, or anxious, or unable to sleep at night because your mind is troubled all the time - these days? Not at all   Social Connections   In a typical week, how many times do you talk on the phone with family, friends, or neighbors? More than 3   How often do you get together with friends or relatives? More than 3   How often do you attend Worship or Rastafarian services? More than  4   Do you belong to any clubs or organizations such as Pentecostal groups, unions, fraternal or athletic groups, or school groups? No   How often do you attend meetings of the clubs or organizations you belong to? Never   Are you , , , , never , or living with a partner?    Alcohol Use   Q1: How often do you have a drink containing alcohol? Monthly or l   Q2: How many drinks containing alcohol do you have on a typical day when you are drinking? 1 or 2   Q3: How often do you have six or more drinks on one occasion? Never   OTHER   Name(s) of People in Home Myrna Johnson - spouse

## 2024-03-14 NOTE — PROGRESS NOTES
[facility] Hospital Medicine Progress Note      Chief Complaint:  generalized weakness, dizziness and difficulty walking with balance     HPI: (personally reviewed by me and is documented from initial H&P)    73 y.o. White male with a past medical history of hypertension, hyperlipidemia, coronary artery disease status post stents no longer on Plavix, vertigo and BPH.     The patient presented to [facility] on 3/13/2024 with a primary complaint of generalized weakness, dizziness and gait imbalance.  Patient went to bed around 8:30/9:00 AM in a normal state of health.  He had 2 falls in the middle of the night on 3/12/2024. Upon waking up at 1:30 AM today (3/13/2024)  he was experiencing dizziness and generalized weakness.    Patient went back to bed and woke up again a 7:30 AM when he had another fall.  Patient did hit his head on the night stand on one of the 3 falls.  Wife at bedside reports patient has been ambulating to the left.  Patient had to use a walker throughout the night to ambulate due to weakness and gait imbalance.  At baseline patient lives with wife, ambulates independently and is active tending to livestock.  Patient denies complaints of shortness of breath, chest pain, facial droop, slurred speech, focal weakness, headache, vision changes, abdominal pain, nausea, vomiting and diarrhea.  He reports symptoms or improving but are not at baseline.  Patient experiences weakness to the left lower extremity chronically due to needing a left knee replacement.     Upon presentation to the ED, temperature 97.7° F, heart rate 82, blood pressure 132/90, respiratory rate 18 and SpO2 98% on room air.  Labs with WBC 13.21, troponin less than 0.01. UA negative for infection.  EKG normal sinus rhythm and age undetermined septal infarct.  Chest x-ray negative for acute cardiopulmonary process.  CT of the head with no acute intracranial abnormality but chronic microvascular ischemic changes.   CT cervical spine with no acute fracture.  Patient is admitted to hospital medicine services for further medical management.    Interval Hx:   No new complaints reported.  No overnight events reported.    __________________________________________________________________________________________________________________________________  Objective/physical exam:  Vital signs have been personally reviewed by me   General: Appears comfortable, no acute distress.    Integumentary: Warm, dry, intact.  Musculoskeletal: Purposeful movement noted.     Respiratory: No accessory muscle use. Breath sounds are equal.  Cardiovascular: Regular rate.       VITAL SIGNS: 24 HRS MIN & MAX LAST   Temp  Min: 96.9 °F (36.1 °C)  Max: 98.6 °F (37 °C) 96.9 °F (36.1 °C)   BP  Min: 111/75  Max: 144/77 111/75   Pulse  Min: 69  Max: 81  74   Resp  Min: 16  Max: 18 16   SpO2  Min: 95 %  Max: 99 % 96 %     CV Ultrasound Bilateral Doppler Carotid  The right internal carotid artery demonstrated less than 50% stenosis.  The left internal carotid artery demonstrated less than 50% stenosis.  Bilateral vertebral arteries were patent with antegrade flow.    Recent Labs   Lab 03/13/24  1048 03/14/24  0500   WBC 13.21* 9.44   RBC 5.21 5.01   HGB 15.4 15.0   HCT 45.3 44.5   MCV 86.9 88.8   MCH 29.6 29.9   MCHC 34.0 33.7   RDW 13.5 14.0    219   MPV 10.1 10.5*       Recent Labs   Lab 03/13/24  1048 03/14/24  0500    139   K 3.9 4.0   CO2 26 23   BUN 18.5 13.6   CREATININE 1.13 0.95   CALCIUM 9.2 8.7*   MG 1.90 1.80   ALBUMIN 4.1 3.6   ALKPHOS 81 71   ALT 17 14   AST 16 15   BILITOT 0.7 0.9     Microbiology Results (last 7 days)       ** No results found for the last 168 hours. **             Scheduled Med:   atorvastatin  80 mg Oral Daily    ezetimibe  10 mg Oral Daily    finasteride  5 mg Oral Daily    pantoprazole  40 mg Oral BID AC    ranolazine  500 mg Oral BID    sodium chloride 0.9%  10 mL Intravenous Q8H    tamsulosin  0.4 mg Oral Daily  PM        PRN Meds:  acetaminophen, bisacodyL, labetalol   __________________________________________________________________________________________________________________________________  Assessment/Plan:  Acute frontal lobe infarct  History of heart attack   Coronary artery disease  Vertigo  Above present on admission     Anticipated discharge and Disposition when medically stable:    _______________________________________________________________________________________________________________________________    -----acute infarct, etiology not well known echocardiogram results pending.  Blood chemistry significantly benign.  Lipid panel negative.  Hemoglobin A1c 5.8.  TSH within normal limits.  Continue cardiac monitoring to evaluate for atrial fibrillation/arrhythmia.    Troponins negative  Continue supportive care  Continue checking vital signs q4hrs.  Continue to monitor blood pressure.     Nurse notified to page me if any changes occur     DVT prophylaxis initiated   Nutrition Status:     Consults: neuro  I have personally reviewed the specialist documentation and/or have spoken to the specialist with regard to the care of this patient; recommendations are noted above.     _______________________________________________________________________________________________________________________________    I have spent >30 minutes on the day of the visit; time spent includes face to face time and non-face to face time preparing to see the patient (eg, review of tests), independently reviewing and interpreting medical records, both past and current; documenting clinical information in the electronic or other health record, and communicating results to the patient/family/caregiver and care coordinator and nursing team.      All diagnosis and differential diagnosis have been reviewed,  interpreted and communicated appropriately to care team. assessment and plan has been documented; I have personally reviewed the  labs and test results that are presently available and pertinent to this hospital course; I have reviewed medical records based upon their availability.    All of the patient's questions have been  addressed and answered. Patient's is agreeable to the above stated plan.   I will continue to monitor closely and make adjustments to medical management as needed.    Medina Costello, DO   03/14/2024     This note was created with the assistance of Dragon voice recognition software. There may be transcription errors as a result of using this technology however minimal. Effort has been made to assure accuracy of transcription but any obvious errors or omissions should be clarified with the author of the document.

## 2024-03-14 NOTE — PT/OT/SLP EVAL
Physical Therapy Evaluation and Discharge Note    Patient Name:  Carlton Johnson Jr.   MRN:  [id number]    Recommendations:     Discharge therapy intensity: No Therapy Indicated   Discharge Equipment Recommendations: none   Barriers to discharge: None    Assessment:     Carlton Johnson Jr. is a 73 y.o. male admitted with a medical diagnosis of dizziness and weakness. .  At this time, patient is functioning at their prior level of function and does not require further acute PT services.     Recent Surgery: * No surgery found *      Plan:     During this hospitalization, patient does not require further acute PT services.  Please re-consult if situation changes.      Subjective     Chief Complaint: none  Patient/Family Comments/goals: none  Pain/Comfort:  Pain Rating 1: 0/10    Patients cultural, spiritual, Yazidi conflicts given the current situation: no    Living Environment:  Lives with spouse in a SLH with 18 steps (bilateral rails).   Prior to admission, patients level of function was independent.  Equipment used at home: CPAP, blood pressure machine, walker, rolling.  DME owned (not currently used): none.  Upon discharge, patient will have assistance from spouse.    Objective:     Communicated with nurse prior to session.  Patient found up in chair with telemetry upon PT entry to room.    General Precautions: Standard,  (SBP<140)    Orthopedic Precautions:N/A   Braces: N/A  Respiratory Status: Room air  Blood Pressure: 137/78    Exams:  Cognitive Exam:  Patient is oriented to Person, Place, Time, and Situation  Sensation: -       Intact  RLE ROM: WFL  RLE Strength: WFL  LLE ROM: WFL  LLE Strength: WFL    Functional Mobility:  Transfers:  Sit to Stand:  independence with no AD  Gait: 318 ft independently  Balance: good    AM-PAC 6 CLICK MOBILITY  Total Score:24     Education Provided:  Role and goals of PT, transfer training, gait training, balance training, safety awareness, assistive device,  strengthening exercises, and importance of participating in PT to return to PLOF.    Patient left up in chair with all lines intact, call button in reach, and family present.    GOALS:   Multidisciplinary Problems       Physical Therapy Goals       Not on file                    History:     Past Medical History:   Diagnosis Date    Heart attack        Past Surgical History:   Procedure Laterality Date    CORONARY ANGIOPLASTY WITH STENT PLACEMENT         Time Tracking:     PT Received On: 03/14/24  PT Start Time: 0820     PT Stop Time: 0835  PT Total Time (min): 15 min     Billable Minutes: Evaluation 15 minutes      03/14/2024

## 2024-03-15 VITALS
WEIGHT: 200 LBS | SYSTOLIC BLOOD PRESSURE: 121 MMHG | RESPIRATION RATE: 18 BRPM | HEART RATE: 57 BPM | DIASTOLIC BLOOD PRESSURE: 81 MMHG | TEMPERATURE: 97 F | BODY MASS INDEX: 31.39 KG/M2 | OXYGEN SATURATION: 96 % | HEIGHT: 67 IN

## 2024-03-15 PROBLEM — I63.9 CVA (CEREBRAL VASCULAR ACCIDENT): Status: ACTIVE | Noted: 2024-03-15

## 2024-03-15 LAB
ALBUMIN SERPL-MCNC: 3.4 G/DL (ref 3.4–4.8)
ALBUMIN/GLOB SERPL: 1.3 RATIO (ref 1.1–2)
ALP SERPL-CCNC: 79 UNIT/L (ref 40–150)
ALT SERPL-CCNC: 15 UNIT/L (ref 0–55)
AST SERPL-CCNC: 14 UNIT/L (ref 5–34)
BASOPHILS # BLD AUTO: 0.05 X10(3)/MCL
BASOPHILS NFR BLD AUTO: 0.6 %
BILIRUB SERPL-MCNC: 0.7 MG/DL
BUN SERPL-MCNC: 17.3 MG/DL (ref 8.4–25.7)
CALCIUM SERPL-MCNC: 8.6 MG/DL (ref 8.8–10)
CHLORIDE SERPL-SCNC: 106 MMOL/L (ref 98–107)
CO2 SERPL-SCNC: 25 MMOL/L (ref 23–31)
CREAT SERPL-MCNC: 1.04 MG/DL (ref 0.73–1.18)
EOSINOPHIL # BLD AUTO: 0.13 X10(3)/MCL (ref 0–0.9)
EOSINOPHIL NFR BLD AUTO: 1.6 %
ERYTHROCYTE [DISTWIDTH] IN BLOOD BY AUTOMATED COUNT: 13.9 % (ref 11.5–17)
GFR SERPLBLD CREATININE-BSD FMLA CKD-EPI: >60 MLS/MIN/1.73/M2
GLOBULIN SER-MCNC: 2.7 GM/DL (ref 2.4–3.5)
GLUCOSE SERPL-MCNC: 111 MG/DL (ref 82–115)
HCT VFR BLD AUTO: 41.8 % (ref 42–52)
HGB BLD-MCNC: 13.7 G/DL (ref 14–18)
IMM GRANULOCYTES # BLD AUTO: 0.05 X10(3)/MCL (ref 0–0.04)
IMM GRANULOCYTES NFR BLD AUTO: 0.6 %
LYMPHOCYTES # BLD AUTO: 2.2 X10(3)/MCL (ref 0.6–4.6)
LYMPHOCYTES NFR BLD AUTO: 26.3 %
MCH RBC QN AUTO: 29.1 PG (ref 27–31)
MCHC RBC AUTO-ENTMCNC: 32.8 G/DL (ref 33–36)
MCV RBC AUTO: 88.7 FL (ref 80–94)
MONOCYTES # BLD AUTO: 0.73 X10(3)/MCL (ref 0.1–1.3)
MONOCYTES NFR BLD AUTO: 8.7 %
NEUTROPHILS # BLD AUTO: 5.19 X10(3)/MCL (ref 2.1–9.2)
NEUTROPHILS NFR BLD AUTO: 62.2 %
NRBC BLD AUTO-RTO: 0 %
PLATELET # BLD AUTO: 224 X10(3)/MCL (ref 130–400)
PMV BLD AUTO: 10.5 FL (ref 7.4–10.4)
POTASSIUM SERPL-SCNC: 4.2 MMOL/L (ref 3.5–5.1)
PROT SERPL-MCNC: 6.1 GM/DL (ref 5.8–7.6)
RBC # BLD AUTO: 4.71 X10(6)/MCL (ref 4.7–6.1)
SODIUM SERPL-SCNC: 140 MMOL/L (ref 136–145)
WBC # SPEC AUTO: 8.35 X10(3)/MCL (ref 4.5–11.5)

## 2024-03-15 PROCEDURE — 85025 COMPLETE CBC W/AUTO DIFF WBC: CPT | Performed by: PHYSICIAN ASSISTANT

## 2024-03-15 PROCEDURE — 25000003 PHARM REV CODE 250: Performed by: PHYSICIAN ASSISTANT

## 2024-03-15 PROCEDURE — 80053 COMPREHEN METABOLIC PANEL: CPT | Performed by: PHYSICIAN ASSISTANT

## 2024-03-15 PROCEDURE — 25000003 PHARM REV CODE 250: Performed by: INTERNAL MEDICINE

## 2024-03-15 PROCEDURE — A4216 STERILE WATER/SALINE, 10 ML: HCPCS | Performed by: PHYSICIAN ASSISTANT

## 2024-03-15 RX ORDER — ATORVASTATIN CALCIUM 80 MG/1
80 TABLET, FILM COATED ORAL DAILY
Qty: 90 TABLET | Refills: 0 | Status: SHIPPED | OUTPATIENT
Start: 2024-03-16

## 2024-03-15 RX ORDER — ASPIRIN 325 MG
325 TABLET ORAL DAILY
Qty: 30 TABLET | Refills: 0 | Status: SHIPPED | OUTPATIENT
Start: 2024-03-15 | End: 2024-05-20

## 2024-03-15 RX ADMIN — EZETIMIBE 10 MG: 10 TABLET ORAL at 10:03

## 2024-03-15 RX ADMIN — ATORVASTATIN CALCIUM 80 MG: 40 TABLET, FILM COATED ORAL at 10:03

## 2024-03-15 RX ADMIN — RANOLAZINE 500 MG: 500 TABLET, EXTENDED RELEASE ORAL at 10:03

## 2024-03-15 RX ADMIN — SODIUM CHLORIDE, PRESERVATIVE FREE 10 ML: 5 INJECTION INTRAVENOUS at 06:03

## 2024-03-15 RX ADMIN — PANTOPRAZOLE SODIUM 40 MG: 40 TABLET, DELAYED RELEASE ORAL at 05:03

## 2024-03-15 RX ADMIN — FINASTERIDE 5 MG: 5 TABLET, FILM COATED ORAL at 10:03

## 2024-03-15 NOTE — PLAN OF CARE
03/15/24 1208   Final Note   Assessment Type Final Discharge Note   Anticipated Discharge Disposition Home   Post-Acute Status   Coverage Home   Discharge Delays None known at this time

## 2024-03-15 NOTE — PLAN OF CARE
Problem: Adult Inpatient Plan of Care  Goal: Plan of Care Review  3/14/2024 2000 by Wilmer Suarez RN  Outcome: Ongoing, Progressing  3/14/2024 1857 by Wilmer Suarez RN  Outcome: Ongoing, Progressing  Goal: Patient-Specific Goal (Individualized)  3/14/2024 2000 by Wilmer Suarez RN  Outcome: Ongoing, Progressing  3/14/2024 1857 by Wilmer Suarez RN  Outcome: Ongoing, Progressing  Goal: Absence of Hospital-Acquired Illness or Injury  Outcome: Ongoing, Progressing  Goal: Optimal Comfort and Wellbeing  Outcome: Ongoing, Progressing  Goal: Readiness for Transition of Care  Outcome: Ongoing, Progressing     Problem: Communication Impairment (Stroke, Ischemic/Transient Ischemic Attack)  Goal: Improved Communication Skills  Outcome: Ongoing, Progressing     Problem: Functional Ability Impaired (Stroke, Ischemic/Transient Ischemic Attack)  Goal: Optimal Functional Ability  Outcome: Ongoing, Progressing     Problem: Sensorimotor Impairment (Stroke, Ischemic/Transient Ischemic Attack)  Goal: Improved Sensorimotor Function  Outcome: Ongoing, Progressing

## 2024-03-15 NOTE — DISCHARGE SUMMARY
[facility] Hospital Medicine Discharge Summary    Admit Date: 3/13/2024  Discharge Date and Time: 3/15/83654:06 PM  Admitting Physician: HANNAH Team  Discharging Physician: Medina Costello DO.  Primary Care Physician: Radha George MD  Consults: Neurology    Discharge Diagnoses:  Acute frontal lobe infarct  History of heart attack   Coronary artery disease  Vertigo  Above present on admission     Hospital Course:   Chief Complaint:  generalized weakness, dizziness and difficulty walking with balance      HPI: (personally reviewed by me and is documented from initial H&P)    73 y.o. White male with a past medical history of hypertension, hyperlipidemia, coronary artery disease status post stents no longer on Plavix, vertigo and BPH.      The patient presented to [facility] on 3/13/2024 with a primary complaint of generalized weakness, dizziness and gait imbalance.  Patient went to bed around 8:30/9:00 AM in a normal state of health.  He had 2 falls in the middle of the night on 3/12/2024. Upon waking up at 1:30 AM today (3/13/2024)  he was experiencing dizziness and generalized weakness.    Patient went back to bed and woke up again a 7:30 AM when he had another fall.  Patient did hit his head on the night stand on one of the 3 falls.  Wife at bedside reports patient has been ambulating to the left.  Patient had to use a walker throughout the night to ambulate due to weakness and gait imbalance.  At baseline patient lives with wife, ambulates independently and is active tending to livestock.  Patient denies complaints of shortness of breath, chest pain, facial droop, slurred speech, focal weakness, headache, vision changes, abdominal pain, nausea, vomiting and diarrhea.  He reports symptoms or improving but are not at baseline.  Patient experiences weakness to the left lower extremity chronically due to needing a left knee replacement.     Upon presentation to the ED, temperature 97.7°  F, heart rate 82, blood pressure 132/90, respiratory rate 18 and SpO2 98% on room air.  Labs with WBC 13.21, troponin less than 0.01. UA negative for infection.  EKG normal sinus rhythm and age undetermined septal infarct.  Chest x-ray negative for acute cardiopulmonary process.  CT of the head with no acute intracranial abnormality but chronic microvascular ischemic changes.  CT cervical spine with no acute fracture.  Patient is admitted to hospital medicine services for further medical management.  Pt was seen and examined on the day of discharge.    Aspirin increased to 325 mg.  We will follow up with neurologist outpatient for consideration of dual antiplatelet therapy.  Repeat CT of the head is plan at discretion of nephrologists.    Hospital course and discharge care plan has been discussed with patient, patient voices understanding and agreement with plan. All questions have been answered to the best of my ability. Patient is advised to return to ED or call 911 in case of emergency and or if symptoms worsen.      Vitals:  VITAL SIGNS: 24 HRS MIN & MAX LAST   Temp  Min: 97.4 °F (36.3 °C)  Max: 98.6 °F (37 °C) 97.4 °F (36.3 °C)   BP  Min: 109/72  Max: 121/81 121/81   Pulse  Min: 57  Max: 65  (!) 57   Resp  Min: 18  Max: 18 18   SpO2  Min: 94 %  Max: 97 % 96 %       Physical Exam:    General: Appears comfortable, no acute distress.  Integumentary: Warm, dry, intact.  Neuro:  Alert awake oriented.  Comprehension intact    Musculoskeletal: Purposeful movement noted.   Respiratory: No accessory muscle use. Breath sounds are equal.  Cardiovascular: Regular rate. No peripheral edema.      Procedures Performed: No admission procedures for hospital encounter.     Significant Diagnostic Studies: See Full reports for all details    Recent Labs   Lab 03/13/24  1048 03/14/24  0500 03/15/24  0516   WBC 13.21* 9.44 8.35   RBC 5.21 5.01 4.71   HGB 15.4 15.0 13.7*   HCT 45.3 44.5 41.8*   MCV 86.9 88.8 88.7   MCH 29.6 29.9 29.1    MCHC 34.0 33.7 32.8*   RDW 13.5 14.0 13.9    219 224   MPV 10.1 10.5* 10.5*       Recent Labs   Lab 03/13/24  1048 03/14/24  0500 03/15/24  0516    139 140   K 3.9 4.0 4.2   CO2 26 23 25   BUN 18.5 13.6 17.3   CREATININE 1.13 0.95 1.04   CALCIUM 9.2 8.7* 8.6*   MG 1.90 1.80  --    ALBUMIN 4.1 3.6 3.4   ALKPHOS 81 71 79   ALT 17 14 15   AST 16 15 14   BILITOT 0.7 0.9 0.7        Microbiology Results (last 7 days)       ** No results found for the last 168 hours. **             CV Ultrasound Bilateral Doppler Carotid  The right internal carotid artery demonstrated less than 50% stenosis.  The left internal carotid artery demonstrated less than 50% stenosis.  Bilateral vertebral arteries were patent with antegrade flow.         Medication List        ASK your doctor about these medications      aluminum-magnesium hydroxide-simethicone 200-200-20 mg/5 mL Susp  Commonly known as: MAALOX     aspirin 81 MG Chew     atorvastatin 20 MG tablet  Commonly known as: LIPITOR     clopidogreL 75 mg tablet  Commonly known as: PLAVIX     ezetimibe 10 mg tablet  Commonly known as: ZETIA     finasteride 5 mg tablet  Commonly known as: PROSCAR     metoprolol tartrate 25 MG tablet  Commonly known as: LOPRESSOR     multivit-min-folic-vit K-lycop 400- mcg Tab     naproxen 500 MG EC tablet  Commonly known as: EC NAPROSYN     nitroGLYCERIN 0.4 MG SL tablet  Commonly known as: NITROSTAT     pantoprazole 40 MG tablet  Commonly known as: PROTONIX     ranolazine 500 MG Tb12  Commonly known as: RANEXA     rosuvastatin 5 MG tablet  Commonly known as: CRESTOR     tamsulosin 0.4 mg Cap  Commonly known as: FLOMAX     telmisartan 80 MG Tab  Commonly known as: MICARDIS     triamterene-hydrochlorothiazide 37.5-25 mg 37.5-25 mg per tablet  Commonly known as: MAXZIDE-25     vitamin D 1000 units Tab  Commonly known as: VITAMIN D3               Explained in detail to the patient about the discharge plan, medications, and follow-up  visits. Pt understands and agrees with the treatment plan  Discharge Disposition:   Home with wife with self-care  Discharged Condition: stable  Diet-   Dietary Orders (From admission, onward)       Start     Ordered    03/13/24 1744  Diet Cardiac  Diet effective now         03/13/24 1743                   Medications Per DC med rec  Activities as tolerated   Follow-up Information       Amanda Jones FNP. Schedule an appointment as soon as possible for a visit in 3 month(s).    Specialty: Neurology  Why: Please follow up in stroke clinic in 3 months with Amanda Jones NP  Contact information:  [address] [place] [address] Dr Barak HORN [phone]                           For further questions contact hospitalist office    Discharge time 33 minutes    For worsening symptoms, chest pain, shortness of breath, increased abdominal pain, high grade fever, stroke or stroke like symptoms, immediately go to the nearest Emergency Room or call 911 as soon as possible.      Medina Wasserman M.D, on 3/15/2024. at 1:06 PM.

## 2024-03-18 ENCOUNTER — TELEPHONE (OUTPATIENT)
Dept: NEUROLOGY | Facility: CLINIC | Age: 74
End: 2024-03-18
Payer: MEDICARE

## 2024-03-18 DIAGNOSIS — I61.9 NONTRAUMATIC INTRACEREBRAL HEMORRHAGE, UNSPECIFIED CEREBRAL LOCATION, UNSPECIFIED LATERALITY: Primary | ICD-10-CM

## 2024-03-18 DIAGNOSIS — I63.89 OTHER CEREBRAL INFARCTION: ICD-10-CM

## 2024-03-18 NOTE — TELEPHONE ENCOUNTER
Caller: Myrna, wife     Issue: Reports patient was seen in the hospital by Dr. Piper and she was under the impression Dr. Piper wanted patient to have a CT scan on March 20th however, they do not have any orders.      Call back number: [phone]         Note     Myrna Johnson [phone]

## 2024-03-18 NOTE — TELEPHONE ENCOUNTER
Caller: Myrna, wife    Issue: Reports patient was seen in the hospital by Dr. Piper and she was under the impression Dr. Piper wanted patient to have a CT scan on March 20th however, they do not have any orders.     Call back number: [phone]

## 2024-03-20 ENCOUNTER — HOSPITAL ENCOUNTER (OUTPATIENT)
Dept: RADIOLOGY | Facility: HOSPITAL | Age: 74
Discharge: HOME | End: 2024-03-20
Attending: PSYCHIATRY & NEUROLOGY
Payer: MEDICARE

## 2024-03-20 DIAGNOSIS — I63.89 OTHER CEREBRAL INFARCTION: ICD-10-CM

## 2024-03-20 PROCEDURE — 70450 CT HEAD/BRAIN W/O DYE: CPT | Mod: TC

## 2024-03-20 NOTE — PHYSICIAN QUERY
PT Name: Carlton Johnson Jr.  MR #: [id number]     Documentation Clarification      CDS/: Chirag Vela RN           Contact information:  [email or web address]    This form is a permanent document in the medical record.     Query Date: March 20, 2024    By submitting this query, we are merely seeking further clarification of documentation. Please utilize your independent clinical judgment when addressing the question(s) below.    The Medical Record reflects the following:    Supporting Clinical Findings Location in Medical Record   73 year-old male with pat medical history of MI s/p cardiac stents and vertigo who presented to the ED with complaints of generalized weakness and dizziness. Patient reported falling out of bed and and striking his head. He endorsed dizziness with any movement and an inability to walk. Per chart, he states that this dizziness is different from episodes of vertigo in the past.     Dizziness   Rule out stroke  -presented with dizziness  RF: CAD  Etiology: TBD     Stroke workup   -CTh: negative  -MRI brain: MRI revealing for an area of acute infarct seen in the right frontal lobe anteriorly and medially with other punctate areas of acute lacunar infarcts seen in the right frontal region at the cerebral convexity.. There is a focal area of hemorrhagic transformation is seen in the punctate area of acute infarct at the cerebral convexity posteriorly best seen on image number 7 series 8 and image number 7 series 3.  -CTA h/n: negative   -ECHO:  -CUS: negative  -LDL: 63  -TSH: 0.77  -home medications:  Aspirin 81 mg daily Progress Note ([facility]) 3/14 neurology   FINDINGS:  No intracranial mass or lesion is seen.  There is an area of acute infarct seen in the right frontal lobe anteriorly and medially with other punctate areas of acute lacunar infarcts seen in the right frontal region at the cerebral convexity..  There is a focal area of hemorrhagic transformation is seen in the  punctate area of acute infarct at the cerebral convexity posteriorly best seen on image number 7 series 8 and image number 7 series 3.  There is diffuse cerebral atrophy seen along with some compensatory ventricular dilatation and periventricular white matter change consistent with patient's age.  Posterior fossa appears normal.  Calvarium is intact.  Paranasal sinuses appear grossly unremarkable.     Impression:     Acute areas of punctate infarct in the frontal lobe on the right side as outlined above with focal area of hemorrhagic transformation in the posterior frontal infarct at the cerebral convexity as outlined above     Chronic age related changes MRI Brain 3/13                                                                            Provider, please clarify the transformation noted above:    [   ] hemorrhagic transformation Not Significant    x   ] hemorrhagic transformation Significant   [   ] Other (please specify): ____________   [  ] Clinically undetermined

## 2024-03-25 LAB
AV INDEX (PROSTH): 1.04
AV MEAN GRADIENT: 2 MMHG
AV PEAK GRADIENT: 4 MMHG
AV VALVE AREA BY VELOCITY RATIO: 3.2 CM²
AV VALVE AREA: 3.25 CM²
AV VELOCITY RATIO: 1.02
BSA FOR ECHO PROCEDURE: 2.13 M2
DOP CALC AO PEAK VEL: 1.03 M/S
DOP CALC AO VTI: 19.3 CM
DOP CALC LVOT AREA: 3.1 CM2
DOP CALC LVOT DIAMETER: 2 CM
DOP CALC LVOT PEAK VEL: 1.05 M/S
DOP CALC LVOT STROKE VOLUME: 62.8 CM3
DOP CALC MV VTI: 26.8 CM
DOP CALCLVOT PEAK VEL VTI: 20 CM
E WAVE DECELERATION TIME: 217 MSEC
E/A RATIO: 0.53
E/E' RATIO: 9.8 M/S
LEFT ATRIUM SIZE: 3.7 CM
LEFT VENTRICLE DIASTOLIC VOLUME INDEX: 72.73 ML/M2
LEFT VENTRICLE DIASTOLIC VOLUME: 152 ML
LEFT VENTRICLE SYSTOLIC VOLUME INDEX: 30 ML/M2
LEFT VENTRICLE SYSTOLIC VOLUME: 62.6 ML
LV LATERAL E/E' RATIO: 9.8 M/S
LV SEPTAL E/E' RATIO: 9.8 M/S
LVOT MG: 2 MMHG
LVOT MV: 0.67 CM/S
MV MEAN GRADIENT: 2 MMHG
MV PEAK A VEL: 0.92 M/S
MV PEAK E VEL: 0.49 M/S
MV PEAK GRADIENT: 6 MMHG
MV STENOSIS PRESSURE HALF TIME: 42 MS
MV VALVE AREA BY CONTINUITY EQUATION: 2.34 CM2
MV VALVE AREA P 1/2 METHOD: 5.24 CM2
OHS LV EJECTION FRACTION SIMPSONS BIPLANE MOD: 59 %
PISA TR MAX VEL: 2.15 M/S
PV PEAK GRADIENT: 2 MMHG
PV PEAK VELOCITY: 0.77 M/S
TDI LATERAL: 0.05 M/S
TDI SEPTAL: 0.05 M/S
TDI: 0.05 M/S
TR MAX PG: 18 MMHG
TRICUSPID ANNULAR PLANE SYSTOLIC EXCURSION: 1.88 CM

## 2024-05-20 ENCOUNTER — OFFICE VISIT (OUTPATIENT)
Dept: NEUROLOGY | Facility: CLINIC | Age: 74
End: 2024-05-20
Payer: MEDICARE

## 2024-05-20 VITALS
SYSTOLIC BLOOD PRESSURE: 95 MMHG | BODY MASS INDEX: 32.33 KG/M2 | HEIGHT: 67 IN | HEART RATE: 65 BPM | WEIGHT: 206 LBS | DIASTOLIC BLOOD PRESSURE: 73 MMHG

## 2024-05-20 DIAGNOSIS — I63.9 CEREBROVASCULAR ACCIDENT (CVA), UNSPECIFIED MECHANISM: Primary | ICD-10-CM

## 2024-05-20 PROCEDURE — 99999 PR PBB SHADOW E&M-EST. PATIENT-LVL III: CPT | Mod: PBBFAC,,, | Performed by: NURSE PRACTITIONER

## 2024-05-20 PROCEDURE — 99214 OFFICE O/P EST MOD 30 MIN: CPT | Mod: S$PBB,,, | Performed by: NURSE PRACTITIONER

## 2024-05-20 PROCEDURE — 99213 OFFICE O/P EST LOW 20 MIN: CPT | Mod: PBBFAC | Performed by: NURSE PRACTITIONER

## 2024-05-20 RX ORDER — CLOPIDOGREL BISULFATE 75 MG/1
75 TABLET ORAL DAILY
Qty: 30 TABLET | Refills: 11 | Status: SHIPPED | OUTPATIENT
Start: 2024-05-20 | End: 2025-05-20

## 2024-05-20 NOTE — PROGRESS NOTES
Subjective:      Patient ID: Carlton Johnson Jr. is a 73 y.o. male.    Chief Complaint:  Hospital Follow Up (Patient here for stroke follow up. Patient states he gets a headache every now and then, takes Aleve and goes away, located more on the top left side of head, describes as a pressure pain but does not happen often at all. Scheduled on 5/30/2024 for PET scan. )      History of Present Illness  Patient presents for hospital follow up of stroke. He has a history of MI with stent 17 years ago and right knee replacement. Patient presented to [facility] on  3/13/24 after having 3 falls that night. Patient reports on his last fall he was unable to get up due to generalized weakness. During one of the falls, patient hit his head on a night stand, striking the left side of his head. Patient reports by the evening at the hospital all symptoms had resolved. Today patient reports an intermittent left sided headache. Denies any new onset of numbness, tingling or weakness. Denies any difficulty with speech. Will be having a left knee replacement in June 2024. Will be needing clearance at that time. Repeat CT head done on 3/20/24 showed no new bleed. Patient's wife reports patient was on plavix following MI but has been off of plavix x 10 years. Recent Holter monitor with Dr. Yo showed no afib.      Past Medical History:   Diagnosis Date    CVA (cerebral vascular accident) 03/15/2024    Heart attack        Past Surgical History:   Procedure Laterality Date    CORONARY ANGIOPLASTY WITH STENT PLACEMENT         Family History   Problem Relation Name Age of Onset    Alzheimer's disease Mother      Lung cancer Father         Social History     Socioeconomic History    Marital status:    Tobacco Use    Smoking status: Never    Smokeless tobacco: Never   Substance and Sexual Activity    Alcohol use: Yes     Comment: occasionallly    Drug use: Never     Social Determinants of Health     Financial Resource Strain: Low  Risk  (3/14/2024)    Overall Financial Resource Strain (CARDIA)     Difficulty of Paying Living Expenses: Not very hard   Food Insecurity: No Food Insecurity (3/14/2024)    Hunger Vital Sign     Worried About Running Out of Food in the Last Year: Never true     Ran Out of Food in the Last Year: Never true   Transportation Needs: No Transportation Needs (3/14/2024)    PRAPARE - Transportation     Lack of Transportation (Medical): No     Lack of Transportation (Non-Medical): No   Physical Activity: Inactive (3/14/2024)    Exercise Vital Sign     Days of Exercise per Week: 0 days     Minutes of Exercise per Session: 0 min   Stress: No Stress Concern Present (3/14/2024)    [employer] [place] [employer] - Occupational Stress Questionnaire     Feeling of Stress : Not at all   Housing Stability: Low Risk  (3/14/2024)    Housing Stability Vital Sign     Unable to Pay for Housing in the Last Year: No     Number of Places Lived in the Last Year: 1     Unstable Housing in the Last Year: No       Current Outpatient Medications   Medication Sig Dispense Refill    aluminum-magnesium hydroxide-simethicone (MAALOX) 200-200-20 mg/5 mL Susp Take 15 mLs by mouth.      atorvastatin (LIPITOR) 80 MG tablet Take 1 tablet (80 mg total) by mouth once daily. 90 tablet 0    ezetimibe (ZETIA) 10 mg tablet Take 10 mg by mouth.      finasteride (PROSCAR) 5 mg tablet Take 5 mg by mouth.      metoprolol tartrate (LOPRESSOR) 25 MG tablet Take 12.5 mg by mouth 2 (two) times daily.      multivit-min-folic-vit K-lycop 400- mcg Tab Take 1 tablet by mouth once daily.      multivitamin with minerals tablet Take 1 tablet by mouth.      naproxen (EC NAPROSYN) 500 MG EC tablet Take 500 mg by mouth 2 (two) times daily with meals.      nitroGLYCERIN (NITROSTAT) 0.4 MG SL tablet Place 0.4 mg under the tongue.      pantoprazole (PROTONIX) 40 MG tablet Take 40 mg by mouth 2 (two) times daily before meals.      ranolazine (RANEXA) 500 MG Tb12  Take 500 mg by mouth 2 (two) times daily.      tamsulosin (FLOMAX) 0.4 mg Cap Take 0.4 mg by mouth once daily.      telmisartan (MICARDIS) 80 MG Tab Take 80 mg by mouth.      triamterene-hydrochlorothiazide 37.5-25 mg (MAXZIDE-25) 37.5-25 mg per tablet Take 1 tablet by mouth once daily.      vitamin D (VITAMIN D3) 1000 units Tab Take 1,000 Units by mouth once daily.      clopidogreL (PLAVIX) 75 mg tablet Take 1 tablet (75 mg total) by mouth once daily. 30 tablet 11     No current facility-administered medications for this visit.       Review of patient's allergies indicates:  No Known Allergies     Review of Systems  12 point ROS performed and negative unless otherwise documented in HPI  Objective:     Neurologic Exam     Mental Status   Oriented to person, place, and time.   Speech: speech is normal   Level of consciousness: alert    Cranial Nerves   Cranial nerves II through XII intact.     Motor Exam   Muscle bulk: normal    Strength   Strength 5/5 throughout.     Sensory Exam   Light touch normal.     Gait, Coordination, and Reflexes     Gait  Gait: normal      Physical Exam  Vitals reviewed.   Pulmonary:      Effort: Pulmonary effort is normal.   Neurological:      Mental Status: He is oriented to person, place, and time.      Cranial Nerves: Cranial nerves 2-12 are intact.      Motor: Motor strength is normal.     Gait: Gait is intact.   Psychiatric:         Speech: Speech normal.        Assessment:     1. Cerebrovascular accident (CVA), unspecified mechanism        Plan:      Stop  mg, start Plavix 75 mg daily  Continue statin  Secondary stroke prevention measures discussed. Education provided on signs and symptoms of stroke; advised to call 9-1-1 with any new onset of numbness, tingling or weakness, difficulty with speech or facial droop.      Modified [place] Scale 0

## 2024-06-17 PROBLEM — I63.9 CVA (CEREBRAL VASCULAR ACCIDENT): Status: RESOLVED | Noted: 2024-03-15 | Resolved: 2024-06-17

## 2024-09-25 ENCOUNTER — OFFICE VISIT (OUTPATIENT)
Dept: NEUROLOGY | Facility: CLINIC | Age: 74
End: 2024-09-25
Payer: MEDICARE

## 2024-09-25 VITALS
WEIGHT: 205.94 LBS | SYSTOLIC BLOOD PRESSURE: 132 MMHG | HEART RATE: 57 BPM | BODY MASS INDEX: 32.32 KG/M2 | DIASTOLIC BLOOD PRESSURE: 89 MMHG | HEIGHT: 67 IN

## 2024-09-25 DIAGNOSIS — I63.9 CEREBROVASCULAR ACCIDENT (CVA), UNSPECIFIED MECHANISM: Primary | ICD-10-CM

## 2024-09-25 DIAGNOSIS — G47.33 OSA (OBSTRUCTIVE SLEEP APNEA): ICD-10-CM

## 2024-09-25 PROCEDURE — 99999 PR PBB SHADOW E&M-EST. PATIENT-LVL III: CPT | Mod: PBBFAC,,, | Performed by: NURSE PRACTITIONER

## 2024-09-25 PROCEDURE — 99213 OFFICE O/P EST LOW 20 MIN: CPT | Mod: PBBFAC | Performed by: NURSE PRACTITIONER

## 2024-09-25 NOTE — PROGRESS NOTES
Subjective:      Patient ID: Carlton Johnson Jr. is a 73 y.o. male.    Chief Complaint:  Stroke (4 mos f/u . Denies any stroke like symptoms. Would like to discuss sleep apnea )        History of Present Illness  Patient presents for follow up of stroke. He has a history of MI with stent 17 years ago and right knee replacement. Patient presented to [facility] on  3/13/24 after having 3 falls that night. Patient reports by the evening at the hospital all symptoms had resolved. Today patient reports an intermittent left sided headache. Denies any new onset of numbness, tingling or weakness. Denies any difficulty with speech. Patient's wife reports patient has a history of sleep apnea. Reports he has a sleep machine and has not been able to use it since a new one was shipped. He utilizes [employer] for supplies. His sleep study was performed in 2016 by Dr. Millard. AHI at the time was 16.1.           Past Medical History:   Diagnosis Date    CVA (cerebral vascular accident) 03/15/2024    Heart attack        Past Surgical History:   Procedure Laterality Date    CORONARY ANGIOPLASTY WITH STENT PLACEMENT         Family History   Problem Relation Name Age of Onset    Alzheimer's disease Mother      Lung cancer Father         Social History     Socioeconomic History    Marital status:    Tobacco Use    Smoking status: Never    Smokeless tobacco: Never   Substance and Sexual Activity    Alcohol use: Yes     Comment: occasionallly    Drug use: Never     Social Determinants of Health     Financial Resource Strain: Low Risk  (8/5/2024)    Received from [employer] [facility] and Its Subsidiaries and Affiliates    Overall Financial Resource Strain (CARDIA)     Difficulty of Paying Living Expenses: Not hard at all   Food Insecurity: No Food Insecurity (8/5/2024)    Received from [employer] [facility] and Its Subsidiaries and Affiliates    Hunger Vital Sign     Worried  About Running Out of Food in the Last Year: Never true     Ran Out of Food in the Last Year: Never true   Transportation Needs: No Transportation Needs (8/5/2024)    Received from [facility] and Its Subsidiaries and Affiliates    YOLANDA - Transportation     Lack of Transportation (Medical): No     Lack of Transportation (Non-Medical): No   Physical Activity: Inactive (3/14/2024)    Exercise Vital Sign     Days of Exercise per Week: 0 days     Minutes of Exercise per Session: 0 min   Stress: No Stress Concern Present (3/14/2024)    [employer] [place] [employer] - Occupational Stress Questionnaire     Feeling of Stress : Not at all   Housing Stability: Low Risk  (3/14/2024)    Housing Stability Vital Sign     Unable to Pay for Housing in the Last Year: No     Number of Places Lived in the Last Year: 1     Unstable Housing in the Last Year: No       Current Outpatient Medications   Medication Sig Dispense Refill    aluminum-magnesium hydroxide-simethicone (MAALOX) 200-200-20 mg/5 mL Susp Take 15 mLs by mouth.      atorvastatin (LIPITOR) 80 MG tablet Take 1 tablet (80 mg total) by mouth once daily. 90 tablet 0    clopidogreL (PLAVIX) 75 mg tablet Take 1 tablet (75 mg total) by mouth once daily. 30 tablet 11    ezetimibe (ZETIA) 10 mg tablet Take 10 mg by mouth.      finasteride (PROSCAR) 5 mg tablet Take 5 mg by mouth.      metoprolol tartrate (LOPRESSOR) 25 MG tablet Take 12.5 mg by mouth 2 (two) times daily.      multivit-min-folic-vit K-lycop 400- mcg Tab Take 1 tablet by mouth once daily.      multivitamin with minerals tablet Take 1 tablet by mouth.      naproxen (EC NAPROSYN) 500 MG EC tablet Take 500 mg by mouth 2 (two) times daily with meals.      nitroGLYCERIN (NITROSTAT) 0.4 MG SL tablet Place 0.4 mg under the tongue.      pantoprazole (PROTONIX) 40 MG tablet Take 40 mg by mouth 2 (two) times daily before meals.      ranolazine (RANEXA) 500 MG Tb12 Take  500 mg by mouth 2 (two) times daily.      tamsulosin (FLOMAX) 0.4 mg Cap Take 0.4 mg by mouth once daily.      telmisartan (MICARDIS) 80 MG Tab Take 80 mg by mouth.      triamterene-hydrochlorothiazide 37.5-25 mg (MAXZIDE-25) 37.5-25 mg per tablet Take 1 tablet by mouth once daily.      vitamin D (VITAMIN D3) 1000 units Tab Take 1,000 Units by mouth once daily.       No current facility-administered medications for this visit.       Review of patient's allergies indicates:  No Known Allergies     Review of Systems  12 point ROS performed and negative unless otherwise documented in HPI  Objective:        Neurologic Exam      Mental Status   Oriented to person, place, and time.   Speech: speech is normal   Level of consciousness: alert     Cranial Nerves   Cranial nerves II through XII intact.      Motor Exam   Muscle bulk: normal     Strength   Strength 5/5 throughout.      Sensory Exam   Light touch normal.      Gait, Coordination, and Reflexes      Gait  Gait: normal        Physical Exam  Vitals reviewed.   Pulmonary:      Effort: Pulmonary effort is normal.   Neurological:      Mental Status: He is oriented to person, place, and time.      Cranial Nerves: Cranial nerves 2-12 are intact.      Motor: Motor strength is normal.     Gait: Gait is intact.   Psychiatric:         Speech: Speech normal.        Assessment:     1. Cerebrovascular accident (CVA), unspecified mechanism    2. HUSSAIN (obstructive sleep apnea)          Plan:     Continue ASA/statin  Reviewed sleep study from 2016. Can consider repeat sleep study/pap night in the future. Patient reports he will try to utilize his machine more and call with an update.  Based on AHA/ACC 2021 guidelines, patient's primary care doctor should target BP goal of <130/80 mm?Hg, LDL-C <70 mg/dL and HbA1c of <7% to minimize the risk of future strokes.    Secondary stroke prevention measures discussed. Education provided on signs and symptoms of stroke; advised to call 9-1-1  with any new onset of numbness, tingling or weakness, difficulty with speech or facial droop.

## 2025-02-18 ENCOUNTER — ANESTHESIA EVENT (OUTPATIENT)
Dept: ENDOSCOPY | Facility: HOSPITAL | Age: 75
End: 2025-02-18
Payer: MEDICARE

## 2025-02-18 ENCOUNTER — HOSPITAL ENCOUNTER (OUTPATIENT)
Facility: HOSPITAL | Age: 75
Discharge: HOME | End: 2025-02-18
Attending: INTERNAL MEDICINE | Admitting: INTERNAL MEDICINE
Payer: MEDICARE

## 2025-02-18 ENCOUNTER — ANESTHESIA (OUTPATIENT)
Dept: ENDOSCOPY | Facility: HOSPITAL | Age: 75
End: 2025-02-18
Payer: MEDICARE

## 2025-02-18 VITALS
TEMPERATURE: 98 F | SYSTOLIC BLOOD PRESSURE: 139 MMHG | OXYGEN SATURATION: 98 % | DIASTOLIC BLOOD PRESSURE: 88 MMHG | RESPIRATION RATE: 17 BRPM | HEART RATE: 63 BPM

## 2025-02-18 DIAGNOSIS — E66.9 OBESITY, UNSPECIFIED CLASS, UNSPECIFIED OBESITY TYPE, UNSPECIFIED WHETHER SERIOUS COMORBIDITY PRESENT: ICD-10-CM

## 2025-02-18 DIAGNOSIS — Z79.01 ENCOUNTER FOR CURRENT LONG-TERM USE OF ANTICOAGULANTS: ICD-10-CM

## 2025-02-18 DIAGNOSIS — Z86.0100 HISTORY OF COLON POLYPS: ICD-10-CM

## 2025-02-18 DIAGNOSIS — I63.9 CEREBROVASCULAR ACCIDENT (CVA), UNSPECIFIED MECHANISM: ICD-10-CM

## 2025-02-18 DIAGNOSIS — K21.9 GERD WITHOUT ESOPHAGITIS: ICD-10-CM

## 2025-02-18 DIAGNOSIS — I25.10 MULTIPLE VESSEL CORONARY ARTERY DISEASE: ICD-10-CM

## 2025-02-18 PROCEDURE — 88305 TISSUE EXAM BY PATHOLOGIST: CPT | Performed by: INTERNAL MEDICINE

## 2025-02-18 PROCEDURE — 27201423 OPTIME MED/SURG SUP & DEVICES STERILE SUPPLY: Performed by: INTERNAL MEDICINE

## 2025-02-18 PROCEDURE — 45385 COLONOSCOPY W/LESION REMOVAL: CPT | Mod: PT | Performed by: INTERNAL MEDICINE

## 2025-02-18 PROCEDURE — 37000009 HC ANESTHESIA EA ADD 15 MINS: Performed by: INTERNAL MEDICINE

## 2025-02-18 PROCEDURE — 43235 EGD DIAGNOSTIC BRUSH WASH: CPT | Performed by: INTERNAL MEDICINE

## 2025-02-18 PROCEDURE — 63600175 PHARM REV CODE 636 W HCPCS: Mod: JZ,TB | Performed by: NURSE ANESTHETIST, CERTIFIED REGISTERED

## 2025-02-18 PROCEDURE — 37000008 HC ANESTHESIA 1ST 15 MINUTES: Performed by: INTERNAL MEDICINE

## 2025-02-18 RX ORDER — PROPOFOL 10 MG/ML
VIAL (ML) INTRAVENOUS
Status: COMPLETED
Start: 2025-02-18 | End: 2025-02-18

## 2025-02-18 RX ORDER — PROPOFOL 10 MG/ML
VIAL (ML) INTRAVENOUS CONTINUOUS PRN
Status: DISCONTINUED | OUTPATIENT
Start: 2025-02-18 | End: 2025-02-18

## 2025-02-18 RX ORDER — GLYCOPYRROLATE 0.2 MG/ML
INJECTION INTRAMUSCULAR; INTRAVENOUS
Status: COMPLETED
Start: 2025-02-18 | End: 2025-02-18

## 2025-02-18 RX ORDER — LIDOCAINE HYDROCHLORIDE 20 MG/ML
INJECTION, SOLUTION EPIDURAL; INFILTRATION; INTRACAUDAL; PERINEURAL
Status: COMPLETED
Start: 2025-02-18 | End: 2025-02-18

## 2025-02-18 RX ORDER — GLYCOPYRROLATE 0.2 MG/ML
INJECTION INTRAMUSCULAR; INTRAVENOUS
Status: DISCONTINUED | OUTPATIENT
Start: 2025-02-18 | End: 2025-02-18

## 2025-02-18 RX ORDER — LIDOCAINE HYDROCHLORIDE 20 MG/ML
INJECTION, SOLUTION EPIDURAL; INFILTRATION; INTRACAUDAL; PERINEURAL
Status: DISCONTINUED | OUTPATIENT
Start: 2025-02-18 | End: 2025-02-18

## 2025-02-18 RX ADMIN — LIDOCAINE HYDROCHLORIDE 5 ML: 20 INJECTION, SOLUTION INTRAVENOUS at 02:02

## 2025-02-18 RX ADMIN — PROPOFOL 100 MCG/KG/MIN: 10 INJECTION, EMULSION INTRAVENOUS at 02:02

## 2025-02-18 RX ADMIN — GLYCOPYRROLATE 0.2 MG: 0.2 INJECTION INTRAMUSCULAR; INTRAVENOUS at 02:02

## 2025-02-18 NOTE — ANESTHESIA PREPROCEDURE EVALUATION
02/18/2025  Carlton Johnson Jr. is a 74 y.o., male.    Pre Vitals  Current as of 02/18/25 1412  BP: 118/80 Pulse: 63   Resp: 13 SpO2: 96   Temp: 36.6 °C (97.9 °F)     Heart attack    Past Medical History   CVA (cerebral vascular accident)      Surgical History    CORONARY ANGIOPLASTY WITH STENT PLACEMENT TOTAL REPLACEMENT OF BOTH KNEES USING COMPUTER-ASSISTED NAVIGATION     Substance History    Smoking Status: Never   Smokeless Tobacco Status: Never   Alcohol use: Yes, unspecified volume   Drug use: Never     Prescription Medications  Within last 14 days from 02/18/25   Last Taken Last Updated   aluminum-magnesium hydroxide-simethicone (MAALOX) 200-200-20 mg/5 mL Susp    Taking 09/25/24 0814   atorvastatin (LIPITOR) 80 MG tablet    Taking 09/25/24 0814   clopidogreL (PLAVIX) 75 mg tablet    Taking 09/25/24 0814   ezetimibe (ZETIA) 10 mg tablet    Taking 09/25/24 0814   finasteride (PROSCAR) 5 mg tablet    Taking 09/25/24 0814   metoprolol tartrate (LOPRESSOR) 25 MG tablet    2/17/2025 02/18/25 1344   multivit-min-folic-vit K-lycop 400- mcg Tab    Taking 09/25/24 0814   multivitamin with minerals tablet    Taking 09/25/24 0814   naproxen (EC NAPROSYN) 500 MG EC tablet    Taking 09/25/24 0814   nitroGLYCERIN (NITROSTAT) 0.4 MG SL tablet    Taking 09/25/24 0814   pantoprazole (PROTONIX) 40 MG tablet    Taking 09/25/24 0814   ranolazine (RANEXA) 500 MG Tb12    Taking 09/25/24 0814   tamsulosin (FLOMAX) 0.4 mg Cap    Taking 09/25/24 0814   telmisartan (MICARDIS) 80 MG Tab    Taking 09/25/24 0814   triamterene-hydrochlorothiazide 37.5-25 mg (MAXZIDE-25) 37.5-25 mg per tablet    Taking 09/25/24 0814   vitamin D (VITAMIN D3) 1000 units Tab    Taking 09/25/24 0814     EKG 3/13/24   Normal sinus rhythm   Low voltage QRS   Septal infarct ,age undetermined   ECHO 3/13/24     Left Ventricle: There is normal  systolic function with a visually estimated ejection fraction of 55 - 60%. Grade I diastolic dysfunction.    Right Ventricle: Right ventricle was not well visualized due to poor acoustic window. Normal right ventricular cavity size. Systolic function is normal.    Left Atrium: Agitated saline study of the atrial septum is negative, suggesting absence of intracardiac shunt at the atrial level.    Pre-op Assessment    I have reviewed the Patient Summary Reports.     I have reviewed the Nursing Notes. I have reviewed the NPO Status.   I have reviewed the Medications.     Review of Systems  Cardiovascular:       Past MI    CABG/stent                           Hx of Myocardial Infarction                        Pulmonary:  Pulmonary Normal                       Hepatic/GI:     GERD                Neurological:   CVA                       CVA - Cerebrovasular Accident                 Endocrine:        Obesity / BMI > 30      Physical Exam  General: Well nourished, Cooperative and Alert    Airway:  Mallampati: II   Mouth Opening: Normal  TM Distance: Normal  Tongue: Normal    Dental:  Intact    Chest/Lungs:  Normal Respiratory Rate    Heart:  Rate: Normal        Anesthesia Plan  Type of Anesthesia, risks & benefits discussed:    Anesthesia Type: Gen Natural Airway  Intra-op Monitoring Plan: Standard ASA Monitors  Post Op Pain Control Plan: IV/PO Opioids PRN  (medical reason for not using multimodal pain management)  Induction:  IV  Informed Consent: Informed consent signed with the Patient and all parties understand the risks and agree with anesthesia plan.  All questions answered.   ASA Score: 3  Day of Surgery Review of History & Physical: H&P Update referred to the surgeon/provider.    Ready For Surgery From Anesthesia Perspective.     .

## 2025-02-18 NOTE — ANESTHESIA POSTPROCEDURE EVALUATION
Anesthesia Post Evaluation    Patient: Carlton Johnson Jr.    Procedure(s) Performed: Procedure(s) (LRB):  DOUBLE (N/A)  COLON (N/A)    Final Anesthesia Type: general      Patient location during evaluation: PACU  Patient participation: Yes- Able to Participate  Level of consciousness: awake and alert  Post-procedure vital signs: reviewed and stable  Pain management: adequate  Airway patency: patent    PONV status at discharge: No PONV  Anesthetic complications: no      Respiratory status: unassisted  Hydration status: euvolemic  Follow-up not needed.              Vitals Value Taken Time   /78 02/18/25 15:23   Temp nl 02/18/25 15:27   Pulse 85 02/18/25 15:23   Resp 18 02/18/25 15:23   SpO2 98 % 02/18/25 15:23         No case tracking events are documented in the log.      Pain/Zoë Score: Zoë Score: 9 (2/18/2025  3:22 PM)

## 2025-02-18 NOTE — TRANSFER OF CARE
Anesthesia Transfer of Care Note    Patient: Carlton Johnson Jr.    Procedure(s) Performed: Procedure(s) (LRB):  DOUBLE (N/A)  COLON (N/A)    Patient location: GI    Anesthesia Type: general    Transport from OR: Transported from OR on room air with adequate spontaneous ventilation    Post pain: adequate analgesia    Post assessment: no apparent anesthetic complications and tolerated procedure well    Post vital signs: stable    Level of consciousness: awake and alert    Nausea/Vomiting: no nausea/vomiting    Complications: none    Transfer of care protocol was followed

## 2025-02-19 LAB — PSYCHE PATHOLOGY RESULT: NORMAL

## 2025-02-20 NOTE — PROVATION PATIENT INSTRUCTIONS
Discharge Summary/Instructions after an Endoscopic Procedure  Patient Name: Carlton Johnson  Patient MRN: [id number]  Patient YOB: 1950 Tuesday, February 18, 2025  Angel Braun MD  Dear patient,  As a result of recent federal legislation (The Federal Cures Act), you may   receive lab or pathology results from your procedure in your [facility]   account before your physician is able to contact you. Your physician or   their representative will relay the results to you with their   recommendations at their soonest availability.  Thank you,  RESTRICTIONS:  During your procedure today, you received medications for sedation.  These   medications may affect your judgment, balance and coordination.  Therefore,   for 24 hours, you have the following restrictions:   - DO NOT drive a car, operate machinery, make legal/financial decisions,   sign important papers or drink alcohol.    ACTIVITY:  Today: no heavy lifting, straining or running due to procedural   sedation/anesthesia.  The following day: return to full activity including work.  DIET:  Eat and drink normally unless instructed otherwise.     TREATMENT FOR COMMON SIDE EFFECTS:  - Mild abdominal pain, nausea, belching, bloating or excessive gas:  rest,   eat lightly and use a heating pad.  - Sore Throat: treat with throat lozenges and/or gargle with warm salt   water.  - Because air was used during the procedure, expelling large amounts of air   from your rectum or belching is normal.  - If a bowel prep was taken, you may not have a bowel movement for 1-3 days.    This is normal.  SYMPTOMS TO WATCH FOR AND REPORT TO YOUR PHYSICIAN:  1. Abdominal pain or bloating, other than gas cramps.  2. Chest pain.  3. Back pain.  4. Signs of infection such as: chills or fever occurring within 24 hours   after the procedure.  5. Rectal bleeding, which would show as bright red, maroon, or black stools.   (A tablespoon of blood from the rectum is not serious,  especially if   hemorrhoids are present.)  6. Vomiting.  7. Weakness or dizziness.  GO DIRECTLY TO THE NEAREST EMERGENCY ROOM IF YOU HAVE ANY OF THE FOLLOWING:      Difficulty breathing              Chills and/or fever over 101 F   Persistent vomiting and/or vomiting blood   Severe abdominal pain   Severe chest pain   Black, tarry stools   Bleeding- more than one tablespoon   Any other symptom or condition that you feel may need urgent attention  Your doctor recommends these additional instructions:  If any biopsies were taken, your doctors clinic will contact you in 1 to 2   weeks with any results.  Recommendations:  - Discharge patient to home following colonoscopy.  Impressions:  - Normal esophagus.   - Normal stomach.   - Normal examined duodenum.   - No specimens collected.  For questions, problems or results please call your physician - Angel Braun MD at Work:  [phone].  [facility] ED at [phone]  IF A COMPLICATION OR EMERGENCY SITUATION ARISES AND YOU ARE UNABLE TO REACH   YOUR PHYSICIAN - GO DIRECTLY TO THE EMERGENCY ROOM.  Angel Braun MD  2/20/2025 3:31:56 PM  This report has been verified and signed electronically.  Dear patient,  As a result of recent federal legislation (The Federal Cures Act), you may   receive lab or pathology results from your procedure in your [facility]   account before your physician is able to contact you. Your physician or   their representative will relay the results to you with their   recommendations at their soonest availability.  Thank you,  PROVATION

## 2025-02-20 NOTE — PROVATION PATIENT INSTRUCTIONS
Discharge Summary/Instructions after an Endoscopic Procedure  Patient Name: Carlton Johnson  Patient MRN: [id number]  Patient YOB: 1950 Tuesday, February 18, 2025  Angel Braun MD  Dear patient,  As a result of recent federal legislation (The Federal Cures Act), you may   receive lab or pathology results from your procedure in your [facility]   account before your physician is able to contact you. Your physician or   their representative will relay the results to you with their   recommendations at their soonest availability.  Thank you,  RESTRICTIONS:  During your procedure today, you received medications for sedation.  These   medications may affect your judgment, balance and coordination.  Therefore,   for 24 hours, you have the following restrictions:   - DO NOT drive a car, operate machinery, make legal/financial decisions,   sign important papers or drink alcohol.    ACTIVITY:  Today: no heavy lifting, straining or running due to procedural   sedation/anesthesia.  The following day: return to full activity including work.  DIET:  Eat and drink normally unless instructed otherwise.     TREATMENT FOR COMMON SIDE EFFECTS:  - Mild abdominal pain, nausea, belching, bloating or excessive gas:  rest,   eat lightly and use a heating pad.  - Sore Throat: treat with throat lozenges and/or gargle with warm salt   water.  - Because air was used during the procedure, expelling large amounts of air   from your rectum or belching is normal.  - If a bowel prep was taken, you may not have a bowel movement for 1-3 days.    This is normal.  SYMPTOMS TO WATCH FOR AND REPORT TO YOUR PHYSICIAN:  1. Abdominal pain or bloating, other than gas cramps.  2. Chest pain.  3. Back pain.  4. Signs of infection such as: chills or fever occurring within 24 hours   after the procedure.  5. Rectal bleeding, which would show as bright red, maroon, or black stools.   (A tablespoon of blood from the rectum is not serious,  especially if   hemorrhoids are present.)  6. Vomiting.  7. Weakness or dizziness.  GO DIRECTLY TO THE NEAREST EMERGENCY ROOM IF YOU HAVE ANY OF THE FOLLOWING:      Difficulty breathing              Chills and/or fever over 101 F   Persistent vomiting and/or vomiting blood   Severe abdominal pain   Severe chest pain   Black, tarry stools   Bleeding- more than one tablespoon   Any other symptom or condition that you feel may need urgent attention  Your doctor recommends these additional instructions:  If any biopsies were taken, your doctors clinic will contact you in 1 to 2   weeks with any results.  Recommendations:  - Discharge patient to home (with spouse).   - Resume previous diet today.   - Continue present medications.   - Await pathology results.   - Repeat colonoscopy is not recommended due to current age (66 years or   older) for adenoma surveillance.   - Return to primary care physician as previously scheduled.   - The findings and recommendations were discussed with the patient and their   spouse.  Impressions:  - Two 4 to 6 mm polyps in the ascending colon, removed with a cold snare.    Resected and retrieved.   - The examination was otherwise normal on direct and retroflexion views.  For questions, problems or results please call your physician - Angel Braun MD at Work:  [phone].  [facility] ED at [phone]  IF A COMPLICATION OR EMERGENCY SITUATION ARISES AND YOU ARE UNABLE TO REACH   YOUR PHYSICIAN - GO DIRECTLY TO THE EMERGENCY ROOM.  Angel Braun MD  2/20/2025 3:37:09 PM  This report has been verified and signed electronically.  Dear patient,  As a result of recent federal legislation (The Federal Cures Act), you may   receive lab or pathology results from your procedure in your [facility]   account before your physician is able to contact you. Your physician or   their representative will relay the results to you with their   recommendations at their soonest  availability.  Thank you,  PROVATION

## 2025-02-20 NOTE — H&P
Endoscopy History and Physical    PCP - Radha George MD    Procedure - EGD/COlonoscopy..  ASA & Mallampati - per anesthesia      HPI:  This is a 74 y.o. male here for evaluation of GERD and hx of polyps:       ROS:  Constitutional: No fevers, chills, No weight loss  ENT: No allergies  CV: No chest pain  Pulm: No shortness of breath  GI: see HPI  Derm: No rash    Medical History:  has a past medical history of CVA (cerebral vascular accident) (03/15/2024) and Heart attack.    Surgical History:  has a past surgical history that includes Coronary angioplasty with stent; Total replacement of both knees using computer-assisted navigation (Bilateral); Esophagogastroduodenoscopy (N/A, 2/18/2025); and Colonoscopy (N/A, 2/18/2025).    Family History: family history includes Alzheimer's disease in his mother; Lung cancer in his father.     Social History:  reports that he has never smoked. He has never used smokeless tobacco. He reports current alcohol use. He reports that he does not use drugs.    Review of patient's allergies indicates:  No Known Allergies    Medications:   Prescriptions Prior to Admission[1]      Objective Findings:    Vital Signs: see nursing notes  Physical Exam:  General Appearance: WM, well appearing in no acute distress  Neuro: A&O x 3, no focal deficits  Eyes:    No scleral icterus  ENT: Neck supple  Lungs: CTA anteriorly  Heart:  S1, S2 normal, no murmurs heard  Abdomen: Soft, non tender, non distended with positive bowel sounds. No hepatosplenomegaly, ascites, or mass  Extremities: no edema  Skin: No rash      Labs:  Lab Results   Component Value Date    WBC 8.35 03/15/2024    HGB 13.9 (L) 08/05/2024    HCT 42.1 08/05/2024     03/15/2024    CHOL 130 03/13/2024    TRIG 70 03/13/2024    HDL 53 03/13/2024    ALT 15 03/15/2024    AST 14 03/15/2024     08/05/2024    K 4.5 08/05/2024     (H) 08/05/2024    CREATININE 1.05 08/05/2024    BUN 17 08/05/2024    CO2 23 08/05/2024     TSH 0.771 03/13/2024    INR 1.1 07/23/2024    HGBA1C 5.3 07/23/2024       I have explained the risks and benefits of endoscopy procedures to the patient including but not limited to bleeding, perforation, infection, and death.    Angel Braun MD         [1]   No medications prior to admission.

## 2025-03-25 ENCOUNTER — OFFICE VISIT (OUTPATIENT)
Dept: NEUROLOGY | Facility: CLINIC | Age: 75
End: 2025-03-25
Payer: MEDICARE

## 2025-03-25 VITALS
HEIGHT: 67 IN | BODY MASS INDEX: 32.32 KG/M2 | WEIGHT: 205.94 LBS | SYSTOLIC BLOOD PRESSURE: 107 MMHG | DIASTOLIC BLOOD PRESSURE: 82 MMHG | HEART RATE: 69 BPM

## 2025-03-25 DIAGNOSIS — I63.9 CEREBROVASCULAR ACCIDENT (CVA), UNSPECIFIED MECHANISM: Primary | ICD-10-CM

## 2025-03-25 DIAGNOSIS — R42 DIZZINESS: ICD-10-CM

## 2025-03-25 PROCEDURE — 99999 PR PBB SHADOW E&M-EST. PATIENT-LVL IV: CPT | Mod: PBBFAC,,, | Performed by: NURSE PRACTITIONER

## 2025-03-25 NOTE — PROGRESS NOTES
Subjective:      Patient ID: Carlton Johnson Jr. is a 74 y.o. male.    Chief Complaint:  Stroke (6 month follow up . Patient c/o dizziness when getting up repeatedly at [employer]. Denies headaches, blurred vision, weakness or slurred speech.)      History of Present Illness  Patient presents for follow up of stroke. He has a history of MI with stent 17 years ago and right knee replacement. Patient presented to [facility] on  3/13/24 after having 3 falls that night. Patient reports by the evening at the hospital all symptoms had resolved. Today patient reports dizziness upon standing in [employer]. Orthostatics performed and revealed a 20 point SBP drop on standing. Denies any new onset of numbness, tingling or weakness. Denies any difficulty with speech. Patient's wife reports patient has a history of sleep apnea. Patient utilizes machine at times.             Past Medical History:   Diagnosis Date    CVA (cerebral vascular accident) 03/15/2024    Heart attack        Past Surgical History:   Procedure Laterality Date    COLONOSCOPY N/A 2/18/2025    Procedure: COLON;  Surgeon: Angel Braun MD;  Location: [facility] ENDOSCOPY;  Service: Gastroenterology;  Laterality: N/A;    CORONARY ANGIOPLASTY WITH STENT PLACEMENT      ESOPHAGOGASTRODUODENOSCOPY N/A 2/18/2025    Procedure: DOUBLE;  Surgeon: Angel Braun MD;  Location: [facility] ENDOSCOPY;  Service: Gastroenterology;  Laterality: N/A;    TOTAL REPLACEMENT OF BOTH KNEES USING COMPUTER-ASSISTED NAVIGATION Bilateral        Family History   Problem Relation Name Age of Onset    Alzheimer's disease Mother      Lung cancer Father         Social History     Socioeconomic History    Marital status:    Tobacco Use    Smoking status: Never    Smokeless tobacco: Never   Substance and Sexual Activity    Alcohol use: Yes     Comment: occasionallly    Drug use: Never     Social Drivers of Health     Financial Resource Strain: Low Risk  (8/5/2024)    [facility] [email or web address]  [facility]    Overall Financial Resource Strain (CARDIA)     Difficulty of Paying Living Expenses: Not hard at all   Food Insecurity: No Food Insecurity (8/5/2024)    [facility]    Hunger Vital Sign     Worried About Running Out of Food in the Last Year: Never true     Ran Out of Food in the Last Year: Never true   Transportation Needs: No Transportation Needs (8/5/2024)    [facility]    PRAPARE - Transportation     Lack of Transportation (Medical): No     Lack of Transportation (Non-Medical): No   Physical Activity: Inactive (3/14/2024)    Exercise Vital Sign     Days of Exercise per Week: 0 days     Minutes of Exercise per Session: 0 min   Stress: No Stress Concern Present (3/14/2024)    [employer] [place] [employer] - Occupational Stress Questionnaire     Feeling of Stress : Not at all   Housing Stability: Low Risk  (3/14/2024)    Housing Stability Vital Sign     Unable to Pay for Housing in the Last Year: No     Number of Places Lived in the Last Year: 1     Unstable Housing in the Last Year: No       Current Medications[1]    Review of patient's allergies indicates:  No Known Allergies     Vitals:    03/25/25 1007 03/25/25 1010   BP: 123/88 107/82   BP Location: Left arm Left arm   Patient Position: Sitting Standing   Pulse: 65 69   Weight: 93.4 kg (205 lb 14.6 oz)    Height: 5' 7" (1.702 m)         Review of Systems  12 point ROS performed and negative unless otherwise documented in HPI  Objective:        Neurologic Exam      Mental Status   Oriented to person, place, and time.   Speech: speech is normal   Level of consciousness: alert     Cranial Nerves   Cranial nerves II through XII intact.      Motor Exam   Muscle bulk: normal     Strength   Strength 5/5 " throughout.      Sensory Exam   Light touch normal.      Gait, Coordination, and Reflexes      Gait  Gait: normal        Physical Exam  Vitals reviewed.   Pulmonary:      Effort: Pulmonary effort is normal.   Neurological:      Mental Status: He is oriented to person, place, and time.      Cranial Nerves: Cranial nerves 2-12 are intact.      Motor: Motor strength is normal.     Gait: Gait is intact.   Psychiatric:         Speech: Speech normal.        Assessment:     1. Cerebrovascular accident (CVA), unspecified mechanism    2. Dizziness    Orthostatic hypotension    Plan:     Continue plavix/statin  Discussed blood pressure drops are most likely a side effect of BP medications. Advised to get up slowly.  Based on AHA/ACC 2021 guidelines, patient's primary care doctor should target BP goal of <130/80 mm?Hg, LDL-C <70 mg/dL and HbA1c of <7% to minimize the risk of future strokes.    Secondary stroke prevention measures discussed. Education provided on signs and symptoms of stroke; advised to call 9-1-1 with any new onset of numbness, tingling or weakness, difficulty with speech or facial droop.            [1]   Current Outpatient Medications   Medication Sig Dispense Refill    aluminum-magnesium hydroxide-simethicone (MAALOX) 200-200-20 mg/5 mL Susp Take 15 mLs by mouth.      atorvastatin (LIPITOR) 80 MG tablet Take 1 tablet (80 mg total) by mouth once daily. 90 tablet 0    clopidogreL (PLAVIX) 75 mg tablet Take 1 tablet (75 mg total) by mouth once daily. 30 tablet 11    ezetimibe (ZETIA) 10 mg tablet Take 10 mg by mouth.      finasteride (PROSCAR) 5 mg tablet Take 5 mg by mouth.      metoprolol tartrate (LOPRESSOR) 25 MG tablet Take 12.5 mg by mouth 2 (two) times daily.      multivit-min-folic-vit K-lycop 400- mcg Tab Take 1 tablet by mouth once daily.      multivitamin with minerals tablet Take 1 tablet by mouth.      naproxen (EC NAPROSYN) 500 MG EC tablet Take 500 mg by mouth 2 (two) times daily with  meals.      nitroGLYCERIN (NITROSTAT) 0.4 MG SL tablet Place 0.4 mg under the tongue.      pantoprazole (PROTONIX) 20 MG tablet Take 20 mg by mouth 2 (two) times daily before meals.      ranolazine (RANEXA) 500 MG Tb12 Take 500 mg by mouth 2 (two) times daily.      tamsulosin (FLOMAX) 0.4 mg Cap Take 0.4 mg by mouth once daily.      telmisartan (MICARDIS) 80 MG Tab Take 80 mg by mouth.      triamterene-hydrochlorothiazide 37.5-25 mg (MAXZIDE-25) 37.5-25 mg per tablet Take 1 tablet by mouth once daily.      vitamin D (VITAMIN D3) 1000 units Tab Take 1,000 Units by mouth once daily.       No current facility-administered medications for this visit.

## 2025-07-07 DIAGNOSIS — I63.9 CEREBROVASCULAR ACCIDENT (CVA), UNSPECIFIED MECHANISM: Primary | ICD-10-CM

## 2025-07-07 RX ORDER — CLOPIDOGREL BISULFATE 75 MG/1
75 TABLET ORAL
Qty: 30 TABLET | Refills: 11 | Status: SHIPPED | OUTPATIENT
Start: 2025-07-07

## (undated) DEVICE — KIT CANIST SUCTION 1200CC

## (undated) DEVICE — KIT SURGICAL COLON .25 1.1OZ

## (undated) DEVICE — TIP SUCTION YANKAUER

## (undated) DEVICE — TUBING O2 FEMALE CONN 13FT

## (undated) DEVICE — ADAPTER DUAL NSL LUER M-M 7FT

## (undated) DEVICE — SOL IRRI STRL WATER 1000ML

## (undated) DEVICE — SNARE SNAREMASTER PLUS 10MM

## (undated) DEVICE — UNDERPAD PROTECT PLUS 17X24IN

## (undated) DEVICE — TRAP SUCTION POLYP

## (undated) DEVICE — COLLECTION SPECIMEN NEPTUNE

## (undated) DEVICE — BLOCK BLOX BITE DENT RIM 54FR